# Patient Record
Sex: FEMALE | Race: WHITE | NOT HISPANIC OR LATINO | Employment: FULL TIME | URBAN - NONMETROPOLITAN AREA
[De-identification: names, ages, dates, MRNs, and addresses within clinical notes are randomized per-mention and may not be internally consistent; named-entity substitution may affect disease eponyms.]

---

## 2017-02-28 ENCOUNTER — APPOINTMENT (OUTPATIENT)
Dept: CT IMAGING | Facility: HOSPITAL | Age: 57
End: 2017-02-28

## 2017-02-28 ENCOUNTER — APPOINTMENT (OUTPATIENT)
Dept: GENERAL RADIOLOGY | Facility: HOSPITAL | Age: 57
End: 2017-02-28

## 2017-02-28 ENCOUNTER — HOSPITAL ENCOUNTER (EMERGENCY)
Facility: HOSPITAL | Age: 57
Discharge: HOME | End: 2017-03-01
Attending: FAMILY MEDICINE | Admitting: FAMILY MEDICINE

## 2017-02-28 DIAGNOSIS — R55 NEAR SYNCOPE: ICD-10-CM

## 2017-02-28 DIAGNOSIS — M54.31 SCIATICA OF RIGHT SIDE: Primary | ICD-10-CM

## 2017-02-28 LAB
ALBUMIN SERPL-MCNC: 4.5 G/DL (ref 3.4–4.8)
ALBUMIN/GLOB SERPL: 1.3 G/DL (ref 1.1–1.8)
ALP SERPL-CCNC: 77 U/L (ref 38–126)
ALT SERPL W P-5'-P-CCNC: 27 U/L (ref 9–52)
ANION GAP SERPL CALCULATED.3IONS-SCNC: 12 MMOL/L (ref 5–15)
AST SERPL-CCNC: 23 U/L (ref 14–36)
BASOPHILS # BLD AUTO: 0.02 10*3/MM3 (ref 0–0.2)
BASOPHILS NFR BLD AUTO: 0.3 % (ref 0–2)
BILIRUB SERPL-MCNC: 0.6 MG/DL (ref 0.2–1.3)
BUN BLD-MCNC: 11 MG/DL (ref 7–21)
BUN/CREAT SERPL: 16.7 (ref 7–25)
CALCIUM SPEC-SCNC: 10.3 MG/DL (ref 8.4–10.2)
CHLORIDE SERPL-SCNC: 103 MMOL/L (ref 95–110)
CK MB SERPL-CCNC: 1.05 NG/ML (ref 0–5)
CK SERPL-CCNC: 41 U/L (ref 30–135)
CO2 SERPL-SCNC: 25 MMOL/L (ref 22–31)
CREAT BLD-MCNC: 0.66 MG/DL (ref 0.5–1)
DEPRECATED RDW RBC AUTO: 41 FL (ref 36.4–46.3)
EOSINOPHIL # BLD AUTO: 0.08 10*3/MM3 (ref 0–0.7)
EOSINOPHIL NFR BLD AUTO: 1.2 % (ref 0–7)
ERYTHROCYTE [DISTWIDTH] IN BLOOD BY AUTOMATED COUNT: 12.7 % (ref 11.5–14.5)
GFR SERPL CREATININE-BSD FRML MDRD: 93 ML/MIN/1.73 (ref 51–120)
GLOBULIN UR ELPH-MCNC: 3.4 GM/DL (ref 2.3–3.5)
GLUCOSE BLD-MCNC: 88 MG/DL (ref 60–100)
HCT VFR BLD AUTO: 38.7 % (ref 35–45)
HGB BLD-MCNC: 12.9 G/DL (ref 12–15.5)
IMM GRANULOCYTES # BLD: 0.02 10*3/MM3 (ref 0–0.02)
IMM GRANULOCYTES NFR BLD: 0.3 % (ref 0–0.5)
INR PPP: 0.96 (ref 0.8–1.2)
LYMPHOCYTES # BLD AUTO: 2.3 10*3/MM3 (ref 0.6–4.2)
LYMPHOCYTES NFR BLD AUTO: 34.9 % (ref 10–50)
MAGNESIUM SERPL-MCNC: 2.3 MG/DL (ref 1.6–2.3)
MCH RBC QN AUTO: 29.1 PG (ref 26.5–34)
MCHC RBC AUTO-ENTMCNC: 33.3 G/DL (ref 31.4–36)
MCV RBC AUTO: 87.2 FL (ref 80–98)
MONOCYTES # BLD AUTO: 0.49 10*3/MM3 (ref 0–0.9)
MONOCYTES NFR BLD AUTO: 7.4 % (ref 0–12)
NEUTROPHILS # BLD AUTO: 3.68 10*3/MM3 (ref 2–8.6)
NEUTROPHILS NFR BLD AUTO: 55.9 % (ref 37–80)
NT-PROBNP SERPL-MCNC: 188 PG/ML (ref 0–900)
PLATELET # BLD AUTO: 305 10*3/MM3 (ref 150–450)
PMV BLD AUTO: 10.1 FL (ref 8–12)
POTASSIUM BLD-SCNC: 3.3 MMOL/L (ref 3.5–5.1)
PROT SERPL-MCNC: 7.9 G/DL (ref 6.3–8.6)
PROTHROMBIN TIME: 12.8 SECONDS (ref 11.1–15.3)
RBC # BLD AUTO: 4.44 10*6/MM3 (ref 3.77–5.16)
SODIUM BLD-SCNC: 140 MMOL/L (ref 137–145)
TROPONIN I SERPL-MCNC: <0.012 NG/ML
TSH SERPL DL<=0.05 MIU/L-ACNC: 0.97 MIU/ML (ref 0.46–4.68)
WBC NRBC COR # BLD: 6.59 10*3/MM3 (ref 3.2–9.8)

## 2017-02-28 PROCEDURE — 82553 CREATINE MB FRACTION: CPT | Performed by: FAMILY MEDICINE

## 2017-02-28 PROCEDURE — 93005 ELECTROCARDIOGRAM TRACING: CPT | Performed by: FAMILY MEDICINE

## 2017-02-28 PROCEDURE — 93010 ELECTROCARDIOGRAM REPORT: CPT | Performed by: INTERNAL MEDICINE

## 2017-02-28 PROCEDURE — 80053 COMPREHEN METABOLIC PANEL: CPT | Performed by: FAMILY MEDICINE

## 2017-02-28 PROCEDURE — 83735 ASSAY OF MAGNESIUM: CPT | Performed by: FAMILY MEDICINE

## 2017-02-28 PROCEDURE — 25010000002 ONDANSETRON PER 1 MG: Performed by: FAMILY MEDICINE

## 2017-02-28 PROCEDURE — 99284 EMERGENCY DEPT VISIT MOD MDM: CPT

## 2017-02-28 PROCEDURE — 25010000002 MORPHINE PER 10 MG: Performed by: FAMILY MEDICINE

## 2017-02-28 PROCEDURE — 82550 ASSAY OF CK (CPK): CPT | Performed by: FAMILY MEDICINE

## 2017-02-28 PROCEDURE — 85025 COMPLETE CBC W/AUTO DIFF WBC: CPT | Performed by: FAMILY MEDICINE

## 2017-02-28 PROCEDURE — 83880 ASSAY OF NATRIURETIC PEPTIDE: CPT | Performed by: FAMILY MEDICINE

## 2017-02-28 PROCEDURE — 70450 CT HEAD/BRAIN W/O DYE: CPT

## 2017-02-28 PROCEDURE — 71020 HC CHEST PA AND LATERAL: CPT

## 2017-02-28 PROCEDURE — 96374 THER/PROPH/DIAG INJ IV PUSH: CPT

## 2017-02-28 PROCEDURE — 96375 TX/PRO/DX INJ NEW DRUG ADDON: CPT

## 2017-02-28 PROCEDURE — 96361 HYDRATE IV INFUSION ADD-ON: CPT

## 2017-02-28 PROCEDURE — 84443 ASSAY THYROID STIM HORMONE: CPT | Performed by: FAMILY MEDICINE

## 2017-02-28 PROCEDURE — 84484 ASSAY OF TROPONIN QUANT: CPT | Performed by: FAMILY MEDICINE

## 2017-02-28 PROCEDURE — 85610 PROTHROMBIN TIME: CPT | Performed by: FAMILY MEDICINE

## 2017-02-28 RX ORDER — ASPIRIN 325 MG
325 TABLET ORAL ONCE
Status: COMPLETED | OUTPATIENT
Start: 2017-02-28 | End: 2017-02-28

## 2017-02-28 RX ORDER — SODIUM CHLORIDE 0.9 % (FLUSH) 0.9 %
10 SYRINGE (ML) INJECTION AS NEEDED
Status: DISCONTINUED | OUTPATIENT
Start: 2017-02-28 | End: 2017-03-01 | Stop reason: HOSPADM

## 2017-02-28 RX ORDER — SODIUM CHLORIDE 9 MG/ML
125 INJECTION, SOLUTION INTRAVENOUS CONTINUOUS
Status: DISCONTINUED | OUTPATIENT
Start: 2017-02-28 | End: 2017-03-01 | Stop reason: HOSPADM

## 2017-02-28 RX ORDER — ONDANSETRON 2 MG/ML
4 INJECTION INTRAMUSCULAR; INTRAVENOUS ONCE
Status: COMPLETED | OUTPATIENT
Start: 2017-02-28 | End: 2017-02-28

## 2017-02-28 RX ORDER — MORPHINE SULFATE 4 MG/ML
4 INJECTION, SOLUTION INTRAMUSCULAR; INTRAVENOUS ONCE
Status: COMPLETED | OUTPATIENT
Start: 2017-02-28 | End: 2017-02-28

## 2017-02-28 RX ADMIN — ASPIRIN 325 MG: 325 TABLET, COATED ORAL at 22:35

## 2017-02-28 RX ADMIN — ONDANSETRON 4 MG: 2 INJECTION, SOLUTION INTRAMUSCULAR; INTRAVENOUS at 22:34

## 2017-02-28 RX ADMIN — SODIUM CHLORIDE 500 ML: 9 INJECTION, SOLUTION INTRAVENOUS at 22:35

## 2017-02-28 RX ADMIN — SODIUM CHLORIDE 125 ML/HR: 9 INJECTION, SOLUTION INTRAVENOUS at 22:35

## 2017-02-28 RX ADMIN — MORPHINE SULFATE 4 MG: 4 INJECTION, SOLUTION INTRAMUSCULAR; INTRAVENOUS at 22:34

## 2017-03-01 ENCOUNTER — APPOINTMENT (OUTPATIENT)
Dept: CT IMAGING | Facility: HOSPITAL | Age: 57
End: 2017-03-01

## 2017-03-01 VITALS
TEMPERATURE: 98 F | HEIGHT: 61 IN | BODY MASS INDEX: 24.92 KG/M2 | DIASTOLIC BLOOD PRESSURE: 54 MMHG | WEIGHT: 132 LBS | OXYGEN SATURATION: 100 % | RESPIRATION RATE: 16 BRPM | SYSTOLIC BLOOD PRESSURE: 109 MMHG | HEART RATE: 60 BPM

## 2017-03-01 LAB
HOLD SPECIMEN: NORMAL
HOLD SPECIMEN: NORMAL
WHOLE BLOOD HOLD SPECIMEN: NORMAL
WHOLE BLOOD HOLD SPECIMEN: NORMAL

## 2017-03-01 PROCEDURE — 72192 CT PELVIS W/O DYE: CPT

## 2017-03-01 PROCEDURE — 25010000002 KETOROLAC TROMETHAMINE PER 15 MG: Performed by: FAMILY MEDICINE

## 2017-03-01 PROCEDURE — 72131 CT LUMBAR SPINE W/O DYE: CPT

## 2017-03-01 RX ORDER — KETOROLAC TROMETHAMINE 30 MG/ML
30 INJECTION, SOLUTION INTRAMUSCULAR; INTRAVENOUS EVERY 6 HOURS PRN
Status: DISCONTINUED | OUTPATIENT
Start: 2017-03-01 | End: 2017-03-01 | Stop reason: HOSPADM

## 2017-03-01 RX ORDER — ONDANSETRON 4 MG/1
4 TABLET, FILM COATED ORAL EVERY 6 HOURS PRN
Qty: 10 TABLET | Refills: 0 | Status: SHIPPED | OUTPATIENT
Start: 2017-03-01 | End: 2018-05-10 | Stop reason: HOSPADM

## 2017-03-01 RX ORDER — PREDNISONE 20 MG/1
20 TABLET ORAL 2 TIMES DAILY
Qty: 10 TABLET | Refills: 0 | Status: SHIPPED | OUTPATIENT
Start: 2017-03-01 | End: 2018-05-10 | Stop reason: HOSPADM

## 2017-03-01 RX ADMIN — KETOROLAC TROMETHAMINE 30 MG: 30 INJECTION, SOLUTION INTRAMUSCULAR; INTRAVENOUS at 00:48

## 2017-03-01 NOTE — ED NOTES
Discharge instructions reviewed and questions answered. Medically cleared for discharge per MD. VSS. RX X 2. Medications reviewed.        Jeannette Paris RN  03/01/17 0226

## 2017-03-01 NOTE — ED NOTES
"Pt complains that she is no unable to roll in the bed or move her legs. Pt states this is new. Assessed range of motion. Pt complains of right hip pain and left knee pain. Pts legs appear stiff. Pt has feeling in legs and feet, just states \"I cant move them\". MD aware.      Jeannette Paris RN  03/01/17 0103    "

## 2017-03-01 NOTE — ED PROVIDER NOTES
"Subjective   Patient is a 56 y.o. female presenting with syncope and chest pain.   Syncope   Episode history:  Single (near-syncope)  Most recent episode:  Today  Timing:  Intermittent  Progression:  Improving  Chronicity:  New  Context: not blood draw, not bowel movement and not exertion    Context comment:  While working at [employer]  Witnessed: yes    Relieved by:  Nothing  Worsened by:  Nothing  Ineffective treatments:  None tried  Associated symptoms: confusion, difficulty breathing, headaches, shortness of breath and weakness (generalized)    Associated symptoms: no anxiety, no chest pain, no diaphoresis, no dizziness, no fever, no focal weakness, no nausea, no palpitations, no recent fall, no recent injury, no seizures, no visual change and no vomiting    Risk factors: coronary artery disease (\"light heart attack\") and vascular disease (MVP)    Risk factors: no seizures    Risk factors comment:  Sees Dr Howell in [place]  Chest Pain   Pain location:  L chest  Pain quality: dull and pressure    Pain radiates to:  Upper back  Pain severity:  Moderate  Onset quality:  Sudden  Duration:  3 hours  Timing:  Intermittent  Progression:  Worsening  Chronicity:  New  Associated symptoms: headache, shortness of breath, syncope and weakness (generalized)    Associated symptoms: no abdominal pain, no anxiety, no cough, no diaphoresis, no dizziness, no dysphagia, no fatigue, no fever, no nausea, no palpitations and no vomiting        Review of Systems   Constitutional: Negative for appetite change, chills, diaphoresis, fatigue and fever.   HENT: Negative for congestion, ear discharge, ear pain, nosebleeds, rhinorrhea, sinus pressure, sore throat and trouble swallowing.    Eyes: Negative for discharge and redness.   Respiratory: Positive for shortness of breath. Negative for apnea, cough, chest tightness and wheezing.    Cardiovascular: Positive for syncope. Negative for chest pain and palpitations. "   Gastrointestinal: Negative for abdominal pain, diarrhea, nausea and vomiting.   Endocrine: Negative for polyuria.   Genitourinary: Negative for dysuria, frequency and urgency.   Musculoskeletal: Negative for myalgias and neck pain.   Skin: Negative for color change and rash.   Allergic/Immunologic: Negative for immunocompromised state.   Neurological: Positive for weakness (generalized) and headaches. Negative for dizziness, focal weakness, seizures, syncope and light-headedness.   Hematological: Negative for adenopathy. Does not bruise/bleed easily.   Psychiatric/Behavioral: Positive for confusion. Negative for behavioral problems.   All other systems reviewed and are negative.      History reviewed. No pertinent past medical history.    Allergies   Allergen Reactions   • Codeine Anaphylaxis   • Latex Itching     Causes skin peeling       History reviewed. No pertinent past surgical history.    History reviewed. No pertinent family history.    Social History     Social History   • Marital status:      Spouse name: N/A   • Number of children: N/A   • Years of education: N/A     Social History Main Topics   • Smoking status: None   • Smokeless tobacco: None   • Alcohol use None   • Drug use: None   • Sexual activity: Not Asked     Other Topics Concern   • None     Social History Narrative   • None           Objective   Physical Exam   Constitutional: She is oriented to person, place, and time. She appears well-developed and well-nourished.   HENT:   Head: Normocephalic and atraumatic.   Nose: Nose normal.   Mouth/Throat: Oropharynx is clear and moist.   Eyes: Conjunctivae and EOM are normal. Pupils are equal, round, and reactive to light. Right eye exhibits no discharge. Left eye exhibits no discharge. No scleral icterus.   Neck: Normal range of motion. Neck supple. No tracheal deviation present.   Cardiovascular: Normal rate, regular rhythm and normal heart sounds.    No murmur heard.  Pulmonary/Chest:  Effort normal and breath sounds normal. No stridor. No respiratory distress. She has no wheezes. She has no rales.   Abdominal: Soft. Bowel sounds are normal. She exhibits no distension and no mass. There is no tenderness. There is no rebound and no guarding.   Musculoskeletal: She exhibits no edema.        Right hip: She exhibits decreased range of motion, decreased strength, tenderness and bony tenderness.        Lumbar back: She exhibits tenderness. She exhibits no swelling, no edema and no deformity.   Patient able to stand at bedside unassisted.  However she made little effort towards walking, and states that she is unable to ambulate.  Reflexes present bilaterally both feet, no Babinski sign elicited. Patient is able to move her toes dorsiflex and plantar flex.   Neurological: She is alert and oriented to person, place, and time. Coordination normal.   Skin: Skin is warm and dry. No rash noted. No erythema.   Psychiatric: She has a normal mood and affect. Her behavior is normal. Thought content normal.   Nursing note and vitals reviewed.      ECG 12 Lead    Date/Time: 3/1/2017 2:07 AM  Performed by: RUBA BALLARD  Authorized by: RUBA BALLARD   Interpreted by physician  Rhythm: sinus rhythm  Rate: normal  QRS axis: normal  Conduction: conduction normal  ST Segments: ST segments normal                 ED Course  ED Course        Labs Reviewed   COMPREHENSIVE METABOLIC PANEL - Abnormal; Notable for the following:        Result Value    Potassium 3.3 (*)     Calcium 10.3 (*)     All other components within normal limits   BNP (IN-HOUSE) - Normal   TROPONIN (IN-HOUSE) - Normal   CBC WITH AUTO DIFFERENTIAL - Normal   PROTIME-INR - Normal    Narrative:     Therapeutic range for most indications is 2.0-3.0 INR,  or 2.5-3.5 for mechanical heart valves.   CK - Normal   CK MB - Normal   TSH - Normal   MAGNESIUM - Normal   RAINBOW DRAW    Narrative:     The following orders were created for panel order  [place] Draw.  Procedure                               Abnormality         Status                     ---------                               -----------         ------                     Light Blue Top[[id number]]                                    Final result               Green Top (Gel)[[id number]]                                   Final result               Lavender Top[[id number]]                                      Final result               Gold Top - SST[[id number]]                                    Final result                 Please view results for these tests on the individual orders.   CBC AND DIFFERENTIAL    Narrative:     The following orders were created for panel order CBC & Differential.  Procedure                               Abnormality         Status                     ---------                               -----------         ------                     CBC Auto Differential[[id number]]         Normal              Final result                 Please view results for these tests on the individual orders.   LIGHT BLUE TOP   GREEN TOP   LAVENDER TOP   GOLD TOP - SST       CT Lumbar Spine Without Contrast   Final Result      1. No acute bony abnormality    2. Mild degenerative changes as described above      Electronically signed by:  Samuel Guzman MD  3/1/2017 1:31 AM CST   Workstation: [id number]      CT Pelvis Without Contrast   Final Result   No acute fracture      Electronically signed by:  Samuel Guzman MD  3/1/2017 1:23 AM CST   Workstation: [email or web address]      XR Chest 2 View   Final Result   CONCLUSION:  No acute cardiopulmonary process identified.      Electronically signed by:  Romana Rodriguez MD  2/28/2017 11:33 PM   CST Workstation: [id number]      CT Head Without Contrast   Final Result   No obvious acute abnormality. Consider MRI for   further assessment if clinically warranted.      Electronically signed by:  Samuel Guzman MD  2/28/2017 11:04 PM   CST Workstation: [id number]           Transferred to [facility] for further evaluation of back pain and weakness offered to patient, with family present in the room.  Admission to the [facility] offered to patient as well for same symptoms, but patient has chosen to go home and refuses admission or transfer.  Pain medications offered for back pain treatment at home, however, patient states that she attends pain clinic for her chronic back pain and states that she receives injections as well.            MDM    Final diagnoses:   Sciatica of right side   Near syncope            Gabriel Schrader MD  03/03/17 7865

## 2017-11-23 ENCOUNTER — APPOINTMENT (OUTPATIENT)
Dept: GENERAL RADIOLOGY | Facility: HOSPITAL | Age: 57
End: 2017-11-23

## 2017-11-23 ENCOUNTER — HOSPITAL ENCOUNTER (EMERGENCY)
Facility: HOSPITAL | Age: 57
Discharge: HOME | End: 2017-11-23
Attending: EMERGENCY MEDICINE | Admitting: EMERGENCY MEDICINE

## 2017-11-23 VITALS
HEIGHT: 61 IN | OXYGEN SATURATION: 98 % | DIASTOLIC BLOOD PRESSURE: 58 MMHG | TEMPERATURE: 97.8 F | HEART RATE: 56 BPM | BODY MASS INDEX: 23.79 KG/M2 | WEIGHT: 126 LBS | RESPIRATION RATE: 16 BRPM | SYSTOLIC BLOOD PRESSURE: 107 MMHG

## 2017-11-23 DIAGNOSIS — R07.9 CHEST PAIN, UNSPECIFIED TYPE: Primary | ICD-10-CM

## 2017-11-23 LAB
ANION GAP SERPL CALCULATED.3IONS-SCNC: 10 MMOL/L (ref 5–15)
APTT PPP: 30.2 SECONDS (ref 20–40.3)
BASOPHILS # BLD AUTO: 0.03 10*3/MM3 (ref 0–0.2)
BASOPHILS NFR BLD AUTO: 0.4 % (ref 0–2)
BUN BLD-MCNC: 14 MG/DL (ref 7–21)
BUN/CREAT SERPL: 16.9 (ref 7–25)
CALCIUM SPEC-SCNC: 9.9 MG/DL (ref 8.4–10.2)
CHLORIDE SERPL-SCNC: 103 MMOL/L (ref 95–110)
CO2 SERPL-SCNC: 27 MMOL/L (ref 22–31)
CREAT BLD-MCNC: 0.83 MG/DL (ref 0.5–1)
D-DIMER, QUANTITATIVE (MAD,POW, STR): <270 NG/ML (FEU) (ref 0–470)
DEPRECATED RDW RBC AUTO: 40.1 FL (ref 36.4–46.3)
EOSINOPHIL # BLD AUTO: 0.05 10*3/MM3 (ref 0–0.7)
EOSINOPHIL NFR BLD AUTO: 0.7 % (ref 0–7)
ERYTHROCYTE [DISTWIDTH] IN BLOOD BY AUTOMATED COUNT: 12.5 % (ref 11.5–14.5)
GFR SERPL CREATININE-BSD FRML MDRD: 71 ML/MIN/1.73 (ref 51–120)
GLUCOSE BLD-MCNC: 93 MG/DL (ref 60–100)
HCT VFR BLD AUTO: 37 % (ref 35–45)
HGB BLD-MCNC: 12.3 G/DL (ref 12–15.5)
HOLD SPECIMEN: NORMAL
IMM GRANULOCYTES # BLD: 0.01 10*3/MM3 (ref 0–0.02)
IMM GRANULOCYTES NFR BLD: 0.1 % (ref 0–0.5)
INR PPP: 0.95 (ref 0.8–1.2)
LYMPHOCYTES # BLD AUTO: 2.53 10*3/MM3 (ref 0.6–4.2)
LYMPHOCYTES NFR BLD AUTO: 37 % (ref 10–50)
MCH RBC QN AUTO: 29.5 PG (ref 26.5–34)
MCHC RBC AUTO-ENTMCNC: 33.2 G/DL (ref 31.4–36)
MCV RBC AUTO: 88.7 FL (ref 80–98)
MONOCYTES # BLD AUTO: 0.51 10*3/MM3 (ref 0–0.9)
MONOCYTES NFR BLD AUTO: 7.5 % (ref 0–12)
NEUTROPHILS # BLD AUTO: 3.71 10*3/MM3 (ref 2–8.6)
NEUTROPHILS NFR BLD AUTO: 54.3 % (ref 37–80)
PLATELET # BLD AUTO: 261 10*3/MM3 (ref 150–450)
PMV BLD AUTO: 9.5 FL (ref 8–12)
POTASSIUM BLD-SCNC: 3.5 MMOL/L (ref 3.5–5.1)
PROTHROMBIN TIME: 12.6 SECONDS (ref 11.1–15.3)
RBC # BLD AUTO: 4.17 10*6/MM3 (ref 3.77–5.16)
SODIUM BLD-SCNC: 140 MMOL/L (ref 137–145)
TROPONIN I SERPL-MCNC: <0.012 NG/ML
WBC NRBC COR # BLD: 6.84 10*3/MM3 (ref 3.2–9.8)

## 2017-11-23 PROCEDURE — 74000 HC ABDOMEN KUB: CPT

## 2017-11-23 PROCEDURE — 93010 ELECTROCARDIOGRAM REPORT: CPT | Performed by: INTERNAL MEDICINE

## 2017-11-23 PROCEDURE — 84484 ASSAY OF TROPONIN QUANT: CPT | Performed by: EMERGENCY MEDICINE

## 2017-11-23 PROCEDURE — 80048 BASIC METABOLIC PNL TOTAL CA: CPT | Performed by: EMERGENCY MEDICINE

## 2017-11-23 PROCEDURE — 85025 COMPLETE CBC W/AUTO DIFF WBC: CPT | Performed by: EMERGENCY MEDICINE

## 2017-11-23 PROCEDURE — 85379 FIBRIN DEGRADATION QUANT: CPT | Performed by: EMERGENCY MEDICINE

## 2017-11-23 PROCEDURE — 85730 THROMBOPLASTIN TIME PARTIAL: CPT | Performed by: EMERGENCY MEDICINE

## 2017-11-23 PROCEDURE — 85610 PROTHROMBIN TIME: CPT | Performed by: EMERGENCY MEDICINE

## 2017-11-23 PROCEDURE — 71020 HC CHEST PA AND LATERAL: CPT

## 2017-11-23 PROCEDURE — 93005 ELECTROCARDIOGRAM TRACING: CPT | Performed by: EMERGENCY MEDICINE

## 2017-11-23 PROCEDURE — 99284 EMERGENCY DEPT VISIT MOD MDM: CPT

## 2017-11-23 RX ORDER — CITALOPRAM 20 MG/1
20 TABLET ORAL DAILY
COMMUNITY
End: 2018-05-10 | Stop reason: HOSPADM

## 2017-11-23 RX ORDER — SODIUM CHLORIDE 0.9 % (FLUSH) 0.9 %
10 SYRINGE (ML) INJECTION AS NEEDED
Status: DISCONTINUED | OUTPATIENT
Start: 2017-11-23 | End: 2017-11-23 | Stop reason: HOSPADM

## 2017-11-23 RX ORDER — TIZANIDINE 4 MG/1
4 TABLET ORAL NIGHTLY PRN
COMMUNITY
End: 2018-05-10 | Stop reason: HOSPADM

## 2017-11-23 RX ORDER — HYDROCODONE BITARTRATE AND ACETAMINOPHEN 10; 325 MG/1; MG/1
1 TABLET ORAL EVERY 6 HOURS PRN
COMMUNITY
End: 2018-05-10 | Stop reason: HOSPADM

## 2017-11-24 NOTE — ED PROVIDER NOTES
Subjective   History of Present Illness  57-year-old female with history of chronic pain issues presents to the emergency department because of intermittent sharp left-sided chest pain under the left lateral breast.  It is nonexertional.  She has associated shortness of breath with this pain.  It is been occurring intermittently since last Friday after she lost her job.  She does not take any oral contraceptive medications.  She has no previous history of coronary artery disease, hypertension, dyslipidemia.  The pain relieved spontaneously.  It lasted for approximately 10 minutes today.  On previous episodes lasted for approximately 1-2 minutes.  She has no diaphoresis or nausea social with this chest pain.  She has no long distance plane travel or lower extremity edema.  No history of DVTs.  No history of cancer or active treatment for cancer.  No recent surgery last 2 months.  Review of Systems   Constitutional: Negative for chills and fever.   HENT: Negative for rhinorrhea, sinus pressure and sneezing.    Eyes: Negative for pain and redness.   Respiratory: Positive for shortness of breath. Negative for cough and chest tightness.    Cardiovascular: Positive for chest pain. Negative for palpitations and leg swelling.   Gastrointestinal: Negative for abdominal pain, diarrhea, nausea and vomiting.   Genitourinary: Negative for dysuria, flank pain, menstrual problem, pelvic pain, vaginal bleeding, vaginal discharge and vaginal pain.   Musculoskeletal: Negative for arthralgias, back pain and joint swelling.   Skin: Negative for rash.   Neurological: Negative for dizziness, syncope, weakness, numbness and headaches.   Hematological: Negative.    Psychiatric/Behavioral: Negative for self-injury and suicidal ideas.       History reviewed. No pertinent past medical history.    Allergies   Allergen Reactions   • Codeine Anaphylaxis   • Latex Itching     Causes skin peeling       History reviewed. No pertinent surgical  history.    History reviewed. No pertinent family history.    Social History     Social History   • Marital status:      Spouse name: N/A   • Number of children: N/A   • Years of education: N/A     Social History Main Topics   • Smoking status: Never Smoker   • Smokeless tobacco: None   • Alcohol use No   • Drug use: No   • Sexual activity: Not Asked     Other Topics Concern   • None     Social History Narrative   • None           Objective   Physical Exam   Constitutional: She is oriented to person, place, and time. She appears well-developed and well-nourished.   HENT:   Head: Normocephalic and atraumatic.   Eyes: Conjunctivae and EOM are normal. Pupils are equal, round, and reactive to light.   Neck: Normal range of motion. Neck supple.   Cardiovascular: Normal rate, regular rhythm and normal heart sounds.    Pulmonary/Chest: Effort normal and breath sounds normal. No respiratory distress. She has no wheezes.   Patient has some mild tenderness on the left lateral chest wall.  No rashes.   Abdominal: Soft. Bowel sounds are normal.   Musculoskeletal: Normal range of motion.   Neurological: She is alert and oriented to person, place, and time.   Skin: Skin is warm and dry.   Psychiatric: She has a normal mood and affect.   Nursing note and vitals reviewed.      Procedures         ED Course  ED Course        Labs Reviewed   BASIC METABOLIC PANEL - Normal   PROTIME-INR - Normal    Narrative:     Therapeutic range for most indications is 2.0-3.0 INR,  or 2.5-3.5 for mechanical heart valves.   APTT - Normal    Narrative:     The recommended Heparin therapeutic range is 68-97 seconds.   TROPONIN (IN-HOUSE) - Normal   D-DIMER, QUANTITATIVE - Normal    Narrative:     Dimer values <500 ng/ml FEU are FDA approved as aid in diagnosis of deep venous thrombosis and pulmonary embolism.  This test should not be used in an exclusion strategy with pretest probability alone.    A recent guideline regarding diagnosis for  pulmonary thomboembolism recommends an adjusted exclusion criterion of age x 10 ng/ml FEU for patients >50 years of age ([place] Intern Med 2015; 163: 701-711).   CBC WITH AUTO DIFFERENTIAL - Normal   CBC AND DIFFERENTIAL    Narrative:     The following orders were created for panel order CBC & Differential.  Procedure                               Abnormality         Status                     ---------                               -----------         ------                     CBC Auto Differential[[id number]]        Normal              Final result                 Please view results for these tests on the individual orders.   EXTRA TUBES    Narrative:     The following orders were created for panel order Extra Tubes.  Procedure                               Abnormality         Status                     ---------                               -----------         ------                     Gold Top - SST[[id number]]                                   In process                   Please view results for these tests on the individual orders.   GOLD TOP - SST     XR Chest 2 View   Final Result   No acute pulmonary or pleural finding.      Electronically signed by:  Vikas Lovelace MD  11/23/2017 7:41 PM   CST Workstation: [id number]      XR Abdomen KUB   Final Result   Nonobstructive bowel gas pattern.      Electronically signed by:  Vikas Lovelace MD  11/23/2017 7:39 PM   CST Workstation: [id number]                  MDM  Number of Diagnoses or Management Options  Chest pain, unspecified type:   Diagnosis management comments: Atypical sharp chest pain is been occurring intermittently for last week.  Doesn't some acute coronary syndrome.  Given the patient's age and sharp component to his shortness of breath we'll send a d-dimer risk stratify for PE.  Likely discharge home if workup is negative.      Troponin less than 0.012.  D-dimer less than 250.  Based off of HEART score patient's low risk for major cardiac event.  Low risk  for pulmonary embolism with a negative d-dimer and low risk Wells criteria.  Did not clear etiology of patient's symptoms but as stated have a low suspicion for serious etiology at this time.  I discussed this with the patient.  I told her to follow primary care doctor next week.  Discussed anti-inflammatories for possible pleurisy.  Is also possible this is musculoskeletal or stress related given that she recently lost her job.  Discharge patient home      Final diagnoses:   Chest pain, unspecified type            Kamar Marroquin MD  11/23/17 2012

## 2017-11-24 NOTE — ED NOTES
Pt given d/c instructions. Vss. INT d/c with cath intact. Dsg applied. Pt ambulated to lobby with family. No distress noted.     DUGLAS Stovall RN  11/23/17 2033

## 2018-04-29 ENCOUNTER — HOSPITAL ENCOUNTER (EMERGENCY)
Facility: HOSPITAL | Age: 58
Discharge: HOME | End: 2018-04-29
Attending: FAMILY MEDICINE | Admitting: FAMILY MEDICINE

## 2018-04-29 VITALS
TEMPERATURE: 99 F | WEIGHT: 130 LBS | RESPIRATION RATE: 18 BRPM | HEART RATE: 70 BPM | BODY MASS INDEX: 24.55 KG/M2 | SYSTOLIC BLOOD PRESSURE: 118 MMHG | HEIGHT: 61 IN | OXYGEN SATURATION: 98 % | DIASTOLIC BLOOD PRESSURE: 66 MMHG

## 2018-04-29 DIAGNOSIS — R10.31 RIGHT LOWER QUADRANT ABDOMINAL PAIN: Primary | ICD-10-CM

## 2018-04-29 LAB
ALBUMIN SERPL-MCNC: 4.2 G/DL (ref 3.4–4.8)
ALBUMIN/GLOB SERPL: 1.3 G/DL (ref 1.1–1.8)
ALP SERPL-CCNC: 64 U/L (ref 38–126)
ALT SERPL W P-5'-P-CCNC: 35 U/L (ref 9–52)
ANION GAP SERPL CALCULATED.3IONS-SCNC: 10 MMOL/L (ref 5–15)
AST SERPL-CCNC: 22 U/L (ref 14–36)
BASOPHILS # BLD AUTO: 0.02 10*3/MM3 (ref 0–0.2)
BASOPHILS NFR BLD AUTO: 0.3 % (ref 0–2)
BILIRUB SERPL-MCNC: 0.6 MG/DL (ref 0.2–1.3)
BILIRUB UR QL STRIP: NEGATIVE
BUN BLD-MCNC: 17 MG/DL (ref 7–21)
BUN/CREAT SERPL: 24.3 (ref 7–25)
CALCIUM SPEC-SCNC: 10.4 MG/DL (ref 8.4–10.2)
CHLORIDE SERPL-SCNC: 104 MMOL/L (ref 95–110)
CLARITY UR: CLEAR
CO2 SERPL-SCNC: 29 MMOL/L (ref 22–31)
COLOR UR: YELLOW
CREAT BLD-MCNC: 0.7 MG/DL (ref 0.5–1)
DEPRECATED RDW RBC AUTO: 40.1 FL (ref 36.4–46.3)
EOSINOPHIL # BLD AUTO: 0.1 10*3/MM3 (ref 0–0.7)
EOSINOPHIL NFR BLD AUTO: 1.6 % (ref 0–7)
ERYTHROCYTE [DISTWIDTH] IN BLOOD BY AUTOMATED COUNT: 12.5 % (ref 11.5–14.5)
GFR SERPL CREATININE-BSD FRML MDRD: 87 ML/MIN/1.73 (ref 51–120)
GLOBULIN UR ELPH-MCNC: 3.2 GM/DL (ref 2.3–3.5)
GLUCOSE BLD-MCNC: 89 MG/DL (ref 60–100)
GLUCOSE UR STRIP-MCNC: NEGATIVE MG/DL
HCG INTACT+B SERPL-ACNC: 7.28 MIU/ML
HCT VFR BLD AUTO: 39.7 % (ref 35–45)
HGB BLD-MCNC: 13.3 G/DL (ref 12–15.5)
HGB UR QL STRIP.AUTO: NEGATIVE
IMM GRANULOCYTES # BLD: 0.01 10*3/MM3 (ref 0–0.02)
IMM GRANULOCYTES NFR BLD: 0.2 % (ref 0–0.5)
KETONES UR QL STRIP: NEGATIVE
LEUKOCYTE ESTERASE UR QL STRIP.AUTO: NEGATIVE
LYMPHOCYTES # BLD AUTO: 1.91 10*3/MM3 (ref 0.6–4.2)
LYMPHOCYTES NFR BLD AUTO: 30.5 % (ref 10–50)
MAGNESIUM SERPL-MCNC: 2.2 MG/DL (ref 1.6–2.3)
MCH RBC QN AUTO: 29.4 PG (ref 26.5–34)
MCHC RBC AUTO-ENTMCNC: 33.5 G/DL (ref 31.4–36)
MCV RBC AUTO: 87.8 FL (ref 80–98)
MONOCYTES # BLD AUTO: 0.49 10*3/MM3 (ref 0–0.9)
MONOCYTES NFR BLD AUTO: 7.8 % (ref 0–12)
NEUTROPHILS # BLD AUTO: 3.74 10*3/MM3 (ref 2–8.6)
NEUTROPHILS NFR BLD AUTO: 59.6 % (ref 37–80)
NITRITE UR QL STRIP: NEGATIVE
PH UR STRIP.AUTO: 8 [PH] (ref 5–9)
PLATELET # BLD AUTO: 273 10*3/MM3 (ref 150–450)
PMV BLD AUTO: 9.8 FL (ref 8–12)
POTASSIUM BLD-SCNC: 4.1 MMOL/L (ref 3.5–5.1)
PROT SERPL-MCNC: 7.4 G/DL (ref 6.3–8.6)
PROT UR QL STRIP: NEGATIVE
RBC # BLD AUTO: 4.52 10*6/MM3 (ref 3.77–5.16)
SODIUM BLD-SCNC: 143 MMOL/L (ref 137–145)
SP GR UR STRIP: 1.02 (ref 1–1.03)
UROBILINOGEN UR QL STRIP: NORMAL
WBC NRBC COR # BLD: 6.27 10*3/MM3 (ref 3.2–9.8)
WHOLE BLOOD HOLD SPECIMEN: NORMAL

## 2018-04-29 PROCEDURE — 99283 EMERGENCY DEPT VISIT LOW MDM: CPT

## 2018-04-29 PROCEDURE — 83735 ASSAY OF MAGNESIUM: CPT | Performed by: FAMILY MEDICINE

## 2018-04-29 PROCEDURE — 80053 COMPREHEN METABOLIC PANEL: CPT | Performed by: FAMILY MEDICINE

## 2018-04-29 PROCEDURE — 85025 COMPLETE CBC W/AUTO DIFF WBC: CPT | Performed by: FAMILY MEDICINE

## 2018-04-29 PROCEDURE — 81003 URINALYSIS AUTO W/O SCOPE: CPT | Performed by: FAMILY MEDICINE

## 2018-04-29 PROCEDURE — 84702 CHORIONIC GONADOTROPIN TEST: CPT | Performed by: FAMILY MEDICINE

## 2018-04-29 RX ORDER — ONDANSETRON 4 MG/1
4 TABLET, ORALLY DISINTEGRATING ORAL EVERY 6 HOURS PRN
Qty: 10 TABLET | Refills: 0 | Status: SHIPPED | OUTPATIENT
Start: 2018-04-29 | End: 2018-05-10 | Stop reason: HOSPADM

## 2018-05-10 ENCOUNTER — OFFICE VISIT (OUTPATIENT)
Dept: OBSTETRICS AND GYNECOLOGY | Facility: CLINIC | Age: 58
End: 2018-05-10

## 2018-05-10 VITALS
DIASTOLIC BLOOD PRESSURE: 63 MMHG | HEIGHT: 61 IN | BODY MASS INDEX: 25.11 KG/M2 | WEIGHT: 133 LBS | SYSTOLIC BLOOD PRESSURE: 102 MMHG

## 2018-05-10 DIAGNOSIS — Z32.01 POSITIVE PREGNANCY TEST: Primary | ICD-10-CM

## 2018-05-10 DIAGNOSIS — N63.0 BREAST MASS: ICD-10-CM

## 2018-05-10 DIAGNOSIS — N93.9 ABNORMAL VAGINAL BLEEDING: ICD-10-CM

## 2018-05-10 DIAGNOSIS — Z32.00 PREGNANCY EXAMINATION OR TEST, PREGNANCY UNCONFIRMED: Primary | ICD-10-CM

## 2018-05-10 DIAGNOSIS — Z12.31 SCREENING MAMMOGRAM, ENCOUNTER FOR: ICD-10-CM

## 2018-05-10 DIAGNOSIS — Z80.3 FAMILY HISTORY OF BREAST CANCER: ICD-10-CM

## 2018-05-10 DIAGNOSIS — Z01.419 WELL WOMAN EXAM WITH ROUTINE GYNECOLOGICAL EXAM: ICD-10-CM

## 2018-05-10 LAB
B-HCG UR QL: NEGATIVE
CANDIDA ALBICANS: NEGATIVE
GARDNERELLA VAGINALIS: NEGATIVE
INTERNAL NEGATIVE CONTROL: POSITIVE
INTERNAL POSITIVE CONTROL: POSITIVE
Lab: NORMAL
TRICHOMONAS VAGINALIS PCR: NEGATIVE

## 2018-05-10 PROCEDURE — G0123 SCREEN CERV/VAG THIN LAYER: HCPCS | Performed by: ADVANCED PRACTICE MIDWIFE

## 2018-05-10 PROCEDURE — 87480 CANDIDA DNA DIR PROBE: CPT | Performed by: ADVANCED PRACTICE MIDWIFE

## 2018-05-10 PROCEDURE — 81025 URINE PREGNANCY TEST: CPT | Performed by: ADVANCED PRACTICE MIDWIFE

## 2018-05-10 PROCEDURE — 87510 GARDNER VAG DNA DIR PROBE: CPT | Performed by: ADVANCED PRACTICE MIDWIFE

## 2018-05-10 PROCEDURE — 99396 PREV VISIT EST AGE 40-64: CPT | Performed by: ADVANCED PRACTICE MIDWIFE

## 2018-05-10 PROCEDURE — 87624 HPV HI-RISK TYP POOLED RSLT: CPT | Performed by: ADVANCED PRACTICE MIDWIFE

## 2018-05-10 PROCEDURE — 87660 TRICHOMONAS VAGIN DIR PROBE: CPT | Performed by: ADVANCED PRACTICE MIDWIFE

## 2018-05-16 ENCOUNTER — OFFICE VISIT (OUTPATIENT)
Dept: OBSTETRICS AND GYNECOLOGY | Facility: CLINIC | Age: 58
End: 2018-05-16

## 2018-05-16 VITALS
DIASTOLIC BLOOD PRESSURE: 70 MMHG | BODY MASS INDEX: 25.49 KG/M2 | WEIGHT: 135 LBS | HEIGHT: 61 IN | SYSTOLIC BLOOD PRESSURE: 128 MMHG

## 2018-05-16 DIAGNOSIS — N95.0 PMB (POSTMENOPAUSAL BLEEDING): Primary | ICD-10-CM

## 2018-05-16 DIAGNOSIS — E34.9 ELEVATED SERUM HCG IN FEMALE, NOT PREGNANT: ICD-10-CM

## 2018-05-16 DIAGNOSIS — N63.0 BREAST MASS: ICD-10-CM

## 2018-05-16 DIAGNOSIS — N63.0 BREAST MASS IN FEMALE: Primary | ICD-10-CM

## 2018-05-16 LAB
LAB AP CASE REPORT: NORMAL
LAB AP GYN ADDITIONAL INFORMATION: NORMAL
LAB AP GYN OTHER FINDINGS: NORMAL
Lab: NORMAL
PATH INTERP SPEC-IMP: NORMAL
STAT OF ADQ CVX/VAG CYTO-IMP: NORMAL

## 2018-05-16 PROCEDURE — 99213 OFFICE O/P EST LOW 20 MIN: CPT | Performed by: OBSTETRICS & GYNECOLOGY

## 2018-05-16 NOTE — PROGRESS NOTES
Subjective   Sondra Felder is a 57 y.o. female F/U from an ER visit where she complained of Right flank pain, abdominal pain, nausea, & fatigue. She is sexually active & had a + quant HCG in the ER. She also complained of spotting a couple of weeks ago    Gynecologic Exam   The patient's primary symptoms include vaginal bleeding. This is a new problem. The current episode started 1 to 4 weeks ago. The problem occurs rarely. The problem has been unchanged. The pain is mild. Associated symptoms include abdominal pain, flank pain and nausea. The vaginal bleeding is spotting. She has not been passing clots. She has not been passing tissue. Nothing aggravates the symptoms. She is sexually active. She uses nothing for contraception.        The following portions of the patient's history were reviewed and updated as appropriate: allergies, current medications, past family history, past medical history, past social history, past surgical history and problem list.    Review of Systems   Constitutional: Positive for fatigue.   Respiratory: Negative.    Cardiovascular: Negative.    Gastrointestinal: Positive for abdominal pain and nausea.   Genitourinary: Positive for flank pain.   Neurological: Negative.    Psychiatric/Behavioral: Negative.        Objective   Physical Exam   Constitutional: She is oriented to person, place, and time. She appears well-developed and well-nourished.   HENT:   Head: Normocephalic and atraumatic.   Eyes: EOM are normal. Pupils are equal, round, and reactive to light.   Neck: Normal range of motion. Neck supple.   Cardiovascular: Normal rate, regular rhythm, normal heart sounds and intact distal pulses.    Pulmonary/Chest: Effort normal and breath sounds normal.   Abdominal: Soft. Bowel sounds are normal. There is tenderness in the right lower quadrant.       Genitourinary: Vagina normal and uterus normal. Pelvic exam was performed with patient supine.   Genitourinary Comments: Cystocele 1  "degree   Musculoskeletal: Normal range of motion.   Lymphadenopathy:   Right axilla swollen tender lymph node   Neurological: She is alert and oriented to person, place, and time.   Skin: Skin is warm and dry.   Psychiatric: She has a normal mood and affect. Her behavior is normal. Judgment and thought content normal.         Assessment/Plan   right breast 2 cm mass at 11 o'clock round, well cicumscribed, tender- left breast mass oblong 3 cm, tender, well circumscribed at 5 o'clock   Pap today  Mammogram next week  Negative urine HCG today  F/U with Dr. Saavedra next week             Subjective     Chief Complaint   Patient presents with   • Gynecologic Exam     Negative pregnancy test in office       Sondra Felder is a 57 y.o. female who presents for an annual exam. The patient has no complaints today. The patient is sexually active. GYN screening history: unknown. The patient wears seatbelts: yes. The patient participates in regular exercise: no. Has the patient ever been transfused or tattooed?: yes. The patient reports that there is not domestic violence in her life.     Menstrual History:  OB History     No data available         Menarche age: not asked  No LMP recorded.         The following portions of the patient's history were reviewed and updated as appropriate:vital signs, allergies, current medications, past medical history, past social history, past surgical history and problem list    Review of Systems   Pertinent items are noted in HPI.     Objective     /63   Ht 154.9 cm (61\")   Wt 60.3 kg (133 lb)   BMI 25.13 kg/m²       Physical Exam    General:   alert and appears older than stated age   Heart: regular rate and rhythm, S1, S2 normal, no murmur, click, rub or gallop   Lungs: clear to auscultation bilaterally   Breast: Right breast mass 2 cm @ 11 o'clock, round, well circumscribed, immobile, Left breast mass 3 cm, oblong @ 5 o'clock immobile, tender, well circumscribed   Abdomen: {soft " with RLQ tenderness & guarding   Vulva: normal   Vagina: normal mucosa, normal discharge, Cystocele 1 degree   Cervix: no bleeding following Pap, no cervical motion tenderness and no lesions   Uterus: normal size   Adnexa: normal adnexa   Rectal: Not performed today       Lab Review   Labs: HCG - quantitative, Urine pregnancy test     Imaging   Ultrasound - Pelvic Vaginal    Assessment/Plan     ASSESSMENT  Healthy female exam.    1. Pregnancy examination or test, pregnancy unconfirmed    2. Abnormal vaginal bleeding    3. Well woman exam with routine gynecological exam    4. Screening mammogram, encounter for    5. Breast mass    6. Family history of breast cancer         PLAN  1.   Orders Placed This Encounter   Procedures   • Gardnerella vaginalis, Trichomonas vaginalis, Candida albicans, PCR - Swab, Vagina   • Mammo screening digital tomosynthesis bilateral w CAD   • Mammography screening bilateral   • POC Pregnancy, Urine       2. Medications prescribed this encounter:    No orders of the defined types were placed in this encounter.      Diagnoses and all orders for this visit:    Pregnancy examination or test, pregnancy unconfirmed  -     POC Pregnancy, Urine    Abnormal vaginal bleeding  -     Gardnerella vaginalis, Trichomonas vaginalis, Candida albicans, PCR - Swab, Vagina  -     Liquid-based Pap Smear, Screening    Well woman exam with routine gynecological exam  -     Gardnerella vaginalis, Trichomonas vaginalis, Candida albicans, PCR - Swab, Vagina  -     IGP, Rfx Aptima HPV ASCU; Future    Screening mammogram, encounter for  -     Mammo screening digital tomosynthesis bilateral w CAD; Future    Breast mass  Comments:  right breast @ 11 o'clock 2 cm, left breast @ 5 o'clock  Orders:  -     Mammography screening bilateral; Future    Family history of breast cancer  Comments:  sister          Follow up: 1 week(s)    Radha Gillespie, APRN  5/16/2018

## 2018-05-16 NOTE — PROGRESS NOTES
Subjective   Sondra Felder is a 57 y.o. female.     Patient presents today to discuss + HCG on home pregnancy test in the setting of PMB.  Patient noted a scant amount of spotting after LMP: about 10 years ago with endometrial ablation.  Discussed quant HCG of 7 with negative UPT in office as being most consistent with a false + result.  We discussed other potential causes of + HCG in this setting including scant HCG production at the pituitary gland in post menopausal setting or potential neoplasia.  The chance of ovarian neoplasia is also expected to be especially low as she just had a normal US showing no adnexal mass.  US also showed endometrial stripe of 3 mm consistent with atrophy as the cause for PMB.  Discussed recommendation for f/u in 1 year.  Patient has also noted a breast mass and lost a sister to breast cancer. Imaging was completed today including mammogram and breast US, discussed recommendation for surgery referral for possible breast Bx.        The following portions of the patient's history were reviewed and updated as appropriate: allergies, current medications, past family history, past medical history, past social history, past surgical history and problem list.      Review of Systems   Genitourinary: Negative for menstrual problem and vaginal bleeding (brief spotting resolved).       Objective   Physical Exam   Constitutional: She is oriented to person, place, and time. She appears well-developed and well-nourished. No distress.   HENT:   Head: Normocephalic and atraumatic.   Right Ear: External ear normal.   Left Ear: External ear normal.   Nose: Nose normal.   Mouth/Throat: Oropharynx is clear and moist.   Neurological: She is alert and oriented to person, place, and time.   Skin: She is not diaphoretic.   Psychiatric: She has a normal mood and affect. Her behavior is normal. Judgment and thought content normal.   Vitals reviewed.      Assessment/Plan   Sondra was seen today for  follow-up.    Diagnoses and all orders for this visit:    PMB (postmenopausal bleeding)    Elevated serum hCG in female, not pregnant    Breast mass  -     Ambulatory Referral to General Surgery       Clinical picture most consistent with false + HCG and atrophic endometrial spotting  F/u 1 year and PRN  Referral to general surgery for possible breast Bx.

## 2018-05-21 LAB — HPV I/H RISK 4 DNA CVX QL PROBE+SIG AMP: NEGATIVE

## 2019-09-19 ENCOUNTER — OFFICE VISIT (OUTPATIENT)
Dept: ORTHOPEDIC SURGERY | Facility: CLINIC | Age: 59
End: 2019-09-19

## 2019-09-19 VITALS
BODY MASS INDEX: 25.49 KG/M2 | WEIGHT: 135 LBS | TEMPERATURE: 98.1 F | DIASTOLIC BLOOD PRESSURE: 68 MMHG | SYSTOLIC BLOOD PRESSURE: 120 MMHG | OXYGEN SATURATION: 98 % | HEART RATE: 70 BPM | RESPIRATION RATE: 12 BRPM | HEIGHT: 61 IN

## 2019-09-19 DIAGNOSIS — M25.512 CHRONIC LEFT SHOULDER PAIN: Primary | ICD-10-CM

## 2019-09-19 DIAGNOSIS — G89.29 CHRONIC LEFT SHOULDER PAIN: Primary | ICD-10-CM

## 2019-09-19 PROCEDURE — 99203 OFFICE O/P NEW LOW 30 MIN: CPT | Performed by: ORTHOPAEDIC SURGERY

## 2019-09-19 RX ORDER — HYDROCODONE BITARTRATE AND ACETAMINOPHEN 10; 325 MG/1; MG/1
1 TABLET ORAL
COMMUNITY

## 2019-09-19 RX ORDER — GABAPENTIN 100 MG/1
100 CAPSULE ORAL 3 TIMES DAILY
COMMUNITY

## 2019-09-19 NOTE — PROGRESS NOTES
Sondra Felder is a 59 y.o. female   Primary provider:  Twial Jha APRN       Chief Complaint   Patient presents with   • Left Shoulder - Pain, Initial Evaluation       HISTORY OF PRESENT ILLNESS: left shoulder pain.  Patient states she had a injury in 2001 to left shoulder which required surgery. She is here today to get medical cleared for employment. Pain scale 6/10       This is the first office visit for evaluation of left shoulder pain.    Ms. Felder is 59 years old and right-hand dominant.  He said she had an injury to her left shoulder in the remote past and was treated surgically by Dr. Duff for torn tendons in her left shoulder.  She has had problems with her shoulder intermittently since then.  She complains of pain at the apex of the shoulder with radiation to the arm worse with lifting and also with overhead use of the left hand.  Her symptoms are worse with overhead lifting and relieved by hydrocodone.  She is having no active treatment for her shoulder.  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  She was asked to come here for medical clearance for this job.  She said she worked at [employer] last year and more recently was working as a .  She said that she was on work restrictions with regard to the left shoulder but was able to do her regular work at [employer] with no difficulty.  She says her work at her new employer will be very similar to the work she did at [employer].  She denies any numbness or tingling in the extremity.    Medications include 10 mg hydrocodone and Neurontin.  She does not smoke.  Past medical history is remarkable for reflux irritable bowel syndrome depression and anxiety.  She is .        Pain   This is a chronic problem. The current episode started more than 1 year ago. The problem occurs intermittently (severe). The problem has been gradually worsening. Associated symptoms include headaches and joint swelling.  Associated symptoms comments: Aching and burning pain left shoulder. Exacerbated by: driving  She has tried rest for the symptoms. The treatment provided no relief.        CONCURRENT MEDICAL HISTORY:    Past Medical History:   Diagnosis Date   • Adenomatous polyp of colon    • Anxiety    • Carpal tunnel syndrome    • Depression    • Exanthematous disorder    • GERD (gastroesophageal reflux disease)    • Hiatal hernia    • IBS (irritable bowel syndrome)    • Umbilical hernia    • URI (upper respiratory infection)        Allergies   Allergen Reactions   • Codeine Anaphylaxis and Palpitations   • Latex Itching and Anaphylaxis     Causes skin peeling         Current Outpatient Medications:   •  gabapentin (NEURONTIN) 100 MG capsule, Take 100 mg by mouth 3 (Three) Times a Day., Disp: , Rfl:   •  HYDROcodone-acetaminophen (NORCO)  MG per tablet, Take 1 tablet by mouth., Disp: , Rfl:     Past Surgical History:   Procedure Laterality Date   •  SECTION     • ENDOSCOPY  10/25/2002    Hiatal hernia, Veronica's esophagus.   • ENDOSCOPY AND COLONOSCOPY      Hemorrhoids   • INJECTION OF MEDICATION  2010    Kenalog (3)     • INJECTION OF MEDICATION  2009    Rocephin (1)      • SHOULDER SURGERY  2004    Impingement syndrome of the left shoulder. arthroscopic subacromial decompression, left shoulder.   • TUBAL ABDOMINAL LIGATION         Family History   Problem Relation Age of Onset   • Colon cancer Mother    • Breast cancer Sister    • Ovarian cancer Sister    • Ovarian cancer Daughter         Social History     Socioeconomic History   • Marital status:      Spouse name: Not on file   • Number of children: Not on file   • Years of education: Not on file   • Highest education level: Not on file   Tobacco Use   • Smoking status: Never Smoker   • Smokeless tobacco: Never Used   Substance and Sexual Activity   • Alcohol use: No   • Drug use: No   • Sexual activity: Yes     Partners: Male     "    Review of Systems   Cardiovascular:        Irregular heartbeat   Gastrointestinal: Positive for diarrhea.   Musculoskeletal: Positive for joint swelling.        Muscle pain   Neurological: Positive for headaches.   Psychiatric/Behavioral: Positive for sleep disturbance. The patient is nervous/anxious.         Mood swings   All other systems reviewed and are negative.    Review of systems is positive as noted in history of present illness.  PHYSICAL EXAMINATION:       /68   Pulse 70   Temp 98.1 °F (36.7 °C)   Resp 12   Ht 154.9 cm (61\")   Wt 61.2 kg (135 lb)   SpO2 98%   BMI 25.51 kg/m²     Physical Exam she is alert pleasant and in no apparent distress.  She appears somewhat older than her stated age.  She responds appropriately to questions and commands.    GAIT:     [x]  Normal  []  Antalgic    Assistive device: [x]  None  []  Walker     []  Crutches  []  Cane     []  Wheelchair  []  Stretcher    Ortho Exam shoulder motion is smooth with complaints of pain on extremes of motion.  Active elevation measures 140 degrees, external rotation 40 degrees, extension 30 degrees, internal rotation 80 degrees, and external rotation 90 degrees.  There is tenderness diffusely about the shoulder.  Impingement testing is negative.  The strength of internal and external rotation of the shoulder are normal.  Abduction strength is mildly decreased with mild complaints of pain.  Radial pulses strong.  Sensory exam is intact to soft touch.    Previous medical records were briefly reviewed.  Dr. Duff reported arthroscopic subacromial decompression with no complication.  Date of surgery was January 2004.      Xr Shoulder 2+ View Left    Result Date: 9/19/2019  Narrative: Ordering Provider:  Alexis Galdamez MD Ordering Diagnosis/Indication:  Chronic left shoulder pain Procedure:  XR SHOULDER 2+ VW LEFT Exam Date:  9/19/19 COMPARISON: None     Impression:  Radiographs of the left shoulder 2 AP views done today of the " acromiohumeral space is normal.  There is mild degenerative change of the acromioclavicular joint.  There is no significant glenohumeral arthritis.  There is a scalloped appearance of the inferior aspect of the distal shaft of the clavicle which I suspect is postoperative change. Alexis Galdamez MD 9/19/19          ASSESSMENT: Left shoulder pain following acromioplasty.  I find no evidence of rotator cuff tear.  She has limited clinical findings.    The requirements of her new job were reviewed.  These include no lifting overhead.  In my opinion she is capable of performing this job with no other accommodations.    She is not interested in having any more active treatment for her shoulder.  If she develops further symptoms she will call for reevaluation.    Diagnoses and all orders for this visit:    Chronic left shoulder pain  -     XR Shoulder 2+ View Left    Other orders  -     HYDROcodone-acetaminophen (NORCO)  MG per tablet; Take 1 tablet by mouth.  -     gabapentin (NEURONTIN) 100 MG capsule; Take 100 mg by mouth 3 (Three) Times a Day.          PLAN    Return if symptoms worsen or fail to improve.    Alexis Galdamez MD

## 2019-10-28 ENCOUNTER — HOSPITAL ENCOUNTER (EMERGENCY)
Facility: HOSPITAL | Age: 59
Discharge: HOME | End: 2019-10-28
Attending: EMERGENCY MEDICINE | Admitting: EMERGENCY MEDICINE

## 2019-10-28 ENCOUNTER — APPOINTMENT (OUTPATIENT)
Dept: CT IMAGING | Facility: HOSPITAL | Age: 59
End: 2019-10-28

## 2019-10-28 VITALS
DIASTOLIC BLOOD PRESSURE: 58 MMHG | HEART RATE: 52 BPM | WEIGHT: 143.6 LBS | BODY MASS INDEX: 27.11 KG/M2 | TEMPERATURE: 98.2 F | SYSTOLIC BLOOD PRESSURE: 119 MMHG | OXYGEN SATURATION: 100 % | RESPIRATION RATE: 16 BRPM | HEIGHT: 61 IN

## 2019-10-28 DIAGNOSIS — R51.9 NONINTRACTABLE EPISODIC HEADACHE, UNSPECIFIED HEADACHE TYPE: Primary | ICD-10-CM

## 2019-10-28 PROCEDURE — 99282 EMERGENCY DEPT VISIT SF MDM: CPT

## 2019-10-28 PROCEDURE — 70450 CT HEAD/BRAIN W/O DYE: CPT

## 2019-11-20 ENCOUNTER — APPOINTMENT (OUTPATIENT)
Dept: CT IMAGING | Facility: HOSPITAL | Age: 59
End: 2019-11-20

## 2019-11-20 ENCOUNTER — HOSPITAL ENCOUNTER (EMERGENCY)
Facility: HOSPITAL | Age: 59
Discharge: HOME | End: 2019-11-20
Attending: FAMILY MEDICINE | Admitting: FAMILY MEDICINE

## 2019-11-20 VITALS
SYSTOLIC BLOOD PRESSURE: 107 MMHG | DIASTOLIC BLOOD PRESSURE: 53 MMHG | RESPIRATION RATE: 20 BRPM | OXYGEN SATURATION: 96 % | TEMPERATURE: 97.6 F | HEART RATE: 86 BPM | BODY MASS INDEX: 25.49 KG/M2 | HEIGHT: 61 IN | WEIGHT: 135 LBS

## 2019-11-20 DIAGNOSIS — R10.11 RIGHT UPPER QUADRANT ABDOMINAL PAIN: Primary | ICD-10-CM

## 2019-11-20 DIAGNOSIS — N39.0 URINARY TRACT INFECTION WITHOUT HEMATURIA, SITE UNSPECIFIED: ICD-10-CM

## 2019-11-20 LAB
ALBUMIN SERPL-MCNC: 4.1 G/DL (ref 3.5–5.2)
ALBUMIN/GLOB SERPL: 1.3 G/DL
ALP SERPL-CCNC: 76 U/L (ref 39–117)
ALT SERPL W P-5'-P-CCNC: 15 U/L (ref 1–33)
ANION GAP SERPL CALCULATED.3IONS-SCNC: 10 MMOL/L (ref 5–15)
AST SERPL-CCNC: 17 U/L (ref 1–32)
BACTERIA UR QL AUTO: ABNORMAL /HPF
BASOPHILS # BLD AUTO: 0.04 10*3/MM3 (ref 0–0.2)
BASOPHILS NFR BLD AUTO: 0.5 % (ref 0–1.5)
BILIRUB SERPL-MCNC: 0.3 MG/DL (ref 0.2–1.2)
BILIRUB UR QL STRIP: NEGATIVE
BUN BLD-MCNC: 15 MG/DL (ref 6–20)
BUN/CREAT SERPL: 23.8 (ref 7–25)
CALCIUM SPEC-SCNC: 10.3 MG/DL (ref 8.6–10.5)
CHLORIDE SERPL-SCNC: 103 MMOL/L (ref 98–107)
CLARITY UR: ABNORMAL
CO2 SERPL-SCNC: 28 MMOL/L (ref 22–29)
COLOR UR: YELLOW
CREAT BLD-MCNC: 0.63 MG/DL (ref 0.57–1)
DEPRECATED RDW RBC AUTO: 41.9 FL (ref 37–54)
EOSINOPHIL # BLD AUTO: 0.17 10*3/MM3 (ref 0–0.4)
EOSINOPHIL NFR BLD AUTO: 2 % (ref 0.3–6.2)
ERYTHROCYTE [DISTWIDTH] IN BLOOD BY AUTOMATED COUNT: 12.7 % (ref 12.3–15.4)
GFR SERPL CREATININE-BSD FRML MDRD: 97 ML/MIN/1.73
GLOBULIN UR ELPH-MCNC: 3.1 GM/DL
GLUCOSE BLD-MCNC: 82 MG/DL (ref 65–99)
GLUCOSE UR STRIP-MCNC: NEGATIVE MG/DL
HCT VFR BLD AUTO: 38.9 % (ref 34–46.6)
HGB BLD-MCNC: 12.4 G/DL (ref 12–15.9)
HGB UR QL STRIP.AUTO: NEGATIVE
HOLD SPECIMEN: NORMAL
HOLD SPECIMEN: NORMAL
HYALINE CASTS UR QL AUTO: ABNORMAL /LPF
IMM GRANULOCYTES # BLD AUTO: 0.02 10*3/MM3 (ref 0–0.05)
IMM GRANULOCYTES NFR BLD AUTO: 0.2 % (ref 0–0.5)
KETONES UR QL STRIP: NEGATIVE
LEUKOCYTE ESTERASE UR QL STRIP.AUTO: ABNORMAL
LIPASE SERPL-CCNC: 36 U/L (ref 13–60)
LYMPHOCYTES # BLD AUTO: 2.34 10*3/MM3 (ref 0.7–3.1)
LYMPHOCYTES NFR BLD AUTO: 27.8 % (ref 19.6–45.3)
MCH RBC QN AUTO: 28.6 PG (ref 26.6–33)
MCHC RBC AUTO-ENTMCNC: 31.9 G/DL (ref 31.5–35.7)
MCV RBC AUTO: 89.6 FL (ref 79–97)
MONOCYTES # BLD AUTO: 0.68 10*3/MM3 (ref 0.1–0.9)
MONOCYTES NFR BLD AUTO: 8.1 % (ref 5–12)
NEUTROPHILS # BLD AUTO: 5.17 10*3/MM3 (ref 1.7–7)
NEUTROPHILS NFR BLD AUTO: 61.4 % (ref 42.7–76)
NITRITE UR QL STRIP: POSITIVE
NRBC BLD AUTO-RTO: 0 /100 WBC (ref 0–0.2)
PH UR STRIP.AUTO: 6.5 [PH] (ref 5–9)
PLATELET # BLD AUTO: 302 10*3/MM3 (ref 140–450)
PMV BLD AUTO: 9.9 FL (ref 6–12)
POTASSIUM BLD-SCNC: 3.3 MMOL/L (ref 3.5–5.2)
PROT SERPL-MCNC: 7.2 G/DL (ref 6–8.5)
PROT UR QL STRIP: NEGATIVE
RBC # BLD AUTO: 4.34 10*6/MM3 (ref 3.77–5.28)
RBC # UR: ABNORMAL /HPF
REF LAB TEST METHOD: ABNORMAL
SODIUM BLD-SCNC: 141 MMOL/L (ref 136–145)
SP GR UR STRIP: 1.02 (ref 1–1.03)
SQUAMOUS #/AREA URNS HPF: ABNORMAL /HPF
TROPONIN T SERPL-MCNC: <0.01 NG/ML (ref 0–0.03)
UROBILINOGEN UR QL STRIP: ABNORMAL
WBC NRBC COR # BLD: 8.42 10*3/MM3 (ref 3.4–10.8)
WBC UR QL AUTO: ABNORMAL /HPF
WHOLE BLOOD HOLD SPECIMEN: NORMAL
WHOLE BLOOD HOLD SPECIMEN: NORMAL

## 2019-11-20 PROCEDURE — 85025 COMPLETE CBC W/AUTO DIFF WBC: CPT

## 2019-11-20 PROCEDURE — 81001 URINALYSIS AUTO W/SCOPE: CPT

## 2019-11-20 PROCEDURE — 74177 CT ABD & PELVIS W/CONTRAST: CPT

## 2019-11-20 PROCEDURE — 80053 COMPREHEN METABOLIC PANEL: CPT | Performed by: FAMILY MEDICINE

## 2019-11-20 PROCEDURE — 83690 ASSAY OF LIPASE: CPT | Performed by: FAMILY MEDICINE

## 2019-11-20 PROCEDURE — 96374 THER/PROPH/DIAG INJ IV PUSH: CPT

## 2019-11-20 PROCEDURE — 25010000002 IOPAMIDOL 61 % SOLUTION: Performed by: FAMILY MEDICINE

## 2019-11-20 PROCEDURE — 99284 EMERGENCY DEPT VISIT MOD MDM: CPT

## 2019-11-20 PROCEDURE — 25010000002 ONDANSETRON PER 1 MG: Performed by: FAMILY MEDICINE

## 2019-11-20 PROCEDURE — 84484 ASSAY OF TROPONIN QUANT: CPT | Performed by: FAMILY MEDICINE

## 2019-11-20 RX ORDER — ONDANSETRON 2 MG/ML
4 INJECTION INTRAMUSCULAR; INTRAVENOUS ONCE
Status: COMPLETED | OUTPATIENT
Start: 2019-11-20 | End: 2019-11-20

## 2019-11-20 RX ORDER — SULFAMETHOXAZOLE AND TRIMETHOPRIM 800; 160 MG/1; MG/1
1 TABLET ORAL ONCE
Status: COMPLETED | OUTPATIENT
Start: 2019-11-20 | End: 2019-11-20

## 2019-11-20 RX ORDER — SODIUM CHLORIDE 0.9 % (FLUSH) 0.9 %
10 SYRINGE (ML) INJECTION AS NEEDED
Status: DISCONTINUED | OUTPATIENT
Start: 2019-11-20 | End: 2019-11-21 | Stop reason: HOSPADM

## 2019-11-20 RX ORDER — SULFAMETHOXAZOLE AND TRIMETHOPRIM 800; 160 MG/1; MG/1
1 TABLET ORAL 2 TIMES DAILY
Qty: 14 TABLET | Refills: 0 | Status: SHIPPED | OUTPATIENT
Start: 2019-11-20 | End: 2019-11-27

## 2019-11-20 RX ADMIN — SULFAMETHOXAZOLE AND TRIMETHOPRIM 160 MG: 800; 160 TABLET ORAL at 22:38

## 2019-11-20 RX ADMIN — IOPAMIDOL 80 ML: 612 INJECTION, SOLUTION INTRAVENOUS at 21:49

## 2019-11-20 RX ADMIN — ONDANSETRON 4 MG: 2 INJECTION INTRAMUSCULAR; INTRAVENOUS at 22:00

## 2019-11-21 NOTE — ED PROVIDER NOTES
Subjective   59-year-old white female presents the emergency department with complaint of right upper quadrant abdominal pain.  She states that this pain started at approximately 5:30 PM while she was driving.  She has had no previous similar episodes.  She did not think too much about this until she got to work and noticed that the pain was increasing to the point that she was having difficulty walking.  She also started getting nauseated associated with this.  She is been urinating normally.  She is having normal bowel movements.  Only previous abdominal surgeries were  x2 and a tubal ligation.  She still has her appendix and gallbladder.            Review of Systems   Constitutional: Negative for activity change, appetite change, chills, fatigue, fever and unexpected weight change.   HENT: Negative for nosebleeds, rhinorrhea, sore throat, trouble swallowing and voice change.    Eyes: Negative for photophobia, pain and visual disturbance.   Respiratory: Negative for apnea, cough, chest tightness, shortness of breath, wheezing and stridor.    Cardiovascular: Negative for chest pain, palpitations and leg swelling.   Gastrointestinal: Positive for abdominal pain and nausea. Negative for abdominal distention, blood in stool, constipation, diarrhea and vomiting.   Endocrine: Negative for cold intolerance, heat intolerance, polydipsia and polyuria.   Genitourinary: Negative for decreased urine volume, difficulty urinating, dysuria, flank pain, hematuria and urgency.   Musculoskeletal: Negative for arthralgias, myalgias, neck pain and neck stiffness.   Skin: Negative for color change, pallor and rash.   Allergic/Immunologic: Negative for immunocompromised state.   Neurological: Negative for dizziness, seizures, syncope, weakness, light-headedness and numbness.   Hematological: Negative for adenopathy.   Psychiatric/Behavioral: Negative for agitation, confusion, dysphoric mood and suicidal ideas. The patient is  not nervous/anxious.        Past Medical History:   Diagnosis Date   • Adenomatous polyp of colon    • Anxiety    • Carpal tunnel syndrome    • Depression    • Exanthematous disorder    • GERD (gastroesophageal reflux disease)    • Hiatal hernia    • IBS (irritable bowel syndrome)    • Umbilical hernia    • URI (upper respiratory infection)        Allergies   Allergen Reactions   • Codeine Anaphylaxis and Palpitations   • Latex Itching and Anaphylaxis     Causes skin peeling       Past Surgical History:   Procedure Laterality Date   •  SECTION     • ENDOSCOPY  10/25/2002    Hiatal hernia, Veronica's esophagus.   • ENDOSCOPY AND COLONOSCOPY      Hemorrhoids   • INJECTION OF MEDICATION  2010    Kenalog (3)     • INJECTION OF MEDICATION  2009    Rocephin (1)      • SHOULDER SURGERY  2004    Impingement syndrome of the left shoulder. arthroscopic subacromial decompression, left shoulder.   • TUBAL ABDOMINAL LIGATION         Family History   Problem Relation Age of Onset   • Colon cancer Mother    • Breast cancer Sister    • Ovarian cancer Sister    • Ovarian cancer Daughter        Social History     Socioeconomic History   • Marital status:      Spouse name: Not on file   • Number of children: Not on file   • Years of education: Not on file   • Highest education level: Not on file   Tobacco Use   • Smoking status: Never Smoker   • Smokeless tobacco: Never Used   Substance and Sexual Activity   • Alcohol use: No   • Drug use: No   • Sexual activity: Yes     Partners: Male           Objective   Physical Exam   Constitutional: She is oriented to person, place, and time. She appears well-developed and well-nourished. No distress.   HENT:   Head: Normocephalic and atraumatic.   Right Ear: External ear normal.   Left Ear: External ear normal.   Mouth/Throat: Oropharynx is clear and moist. No oropharyngeal exudate.   Eyes: Conjunctivae and EOM are normal. Pupils are equal, round, and reactive to  light. Right eye exhibits no discharge. Left eye exhibits no discharge. No scleral icterus.   Neck: Neck supple. No JVD present. No tracheal deviation present. No thyromegaly present.   Cardiovascular: Normal rate, regular rhythm, normal heart sounds and intact distal pulses. Exam reveals no friction rub.   No murmur heard.  Pulmonary/Chest: Effort normal and breath sounds normal. No stridor. No respiratory distress. She has no wheezes. She has no rales. She exhibits no tenderness.   Abdominal: Soft. Bowel sounds are normal. She exhibits no distension and no mass. There is tenderness. There is no rebound and no guarding.   The abdomen is normal in appearance.  She has tenderness in the right upper quadrant and a positive Gibbons sign.   Musculoskeletal: She exhibits no edema, tenderness or deformity.   Lymphadenopathy:     She has no cervical adenopathy.   Neurological: She is alert and oriented to person, place, and time. No cranial nerve deficit. She exhibits normal muscle tone. Coordination normal.   Skin: Skin is warm and dry. Capillary refill takes 2 to 3 seconds. No rash noted. She is not diaphoretic. No erythema.   Psychiatric: She has a normal mood and affect. Her behavior is normal. Judgment and thought content normal.   Nursing note and vitals reviewed.      Procedures           ED Course  ED Course as of Nov 20 2241 Wed Nov 20, 2019 2229 Patient was placed in room 14 and evaluated by me.  Physical exam was concerning for right upper quadrant pathology but her CT was normal.  Labs were obtained and negative except for urinary tract infection.  She was given Bactrim in the emergency department and will be discharged on the same.  At this point, I believe she is medically stable for discharge and will follow up with her primary care provider as an outpatient.  [CE]      ED Course User Index  [CE] Abdelrahman Dennis,         Labs Reviewed   COMPREHENSIVE METABOLIC PANEL - Abnormal; Notable for the  following components:       Result Value    Potassium 3.3 (*)     All other components within normal limits    Narrative:     GFR Normal >60  Chronic Kidney Disease <60  Kidney Failure <15   URINALYSIS W/ MICROSCOPIC IF INDICATED (NO CULTURE) - Abnormal; Notable for the following components:    Appearance, UA Cloudy (*)     Leuk Esterase, UA Moderate (2+) (*)     Nitrite, UA Positive (*)     All other components within normal limits   URINALYSIS, MICROSCOPIC ONLY - Abnormal; Notable for the following components:    RBC, UA 0-2 (*)     WBC, UA 6-12 (*)     Bacteria, UA 4+ (*)     Squamous Epithelial Cells, UA 3-5 (*)     All other components within normal limits   LIPASE - Normal   TROPONIN (IN-HOUSE) - Normal    Narrative:     Troponin T Reference Range:  <= 0.03 ng/mL-   Negative for AMI  >0.03 ng/mL-     Abnormal for myocardial necrosis.  Clinicians would have to utilize clinical acumen, EKG, Troponin and serial changes to determine if it is an Acute Myocardial Infarction or myocardial injury due to an underlying chronic condition.    CBC WITH AUTO DIFFERENTIAL - Normal   RAINBOW DRAW    Narrative:     The following orders were created for panel order [facility] Draw.  Procedure                               Abnormality         Status                     ---------                               -----------         ------                     Light Blue Top[[id number]]                                   Final result               Green Top (Gel)[[id number]]                                  Final result               Lavender Top[[id number]]                                     Final result               Gold Top - SST[[id number]]                                   Final result                 Please view results for these tests on the individual orders.   CBC AND DIFFERENTIAL    Narrative:     The following orders were created for panel order CBC & Differential.  Procedure                               Abnormality          Status                     ---------                               -----------         ------                     CBC Auto Differential[[id number]]        Normal              Final result                 Please view results for these tests on the individual orders.   LIGHT BLUE TOP   GREEN TOP   LAVENDER TOP   GOLD TOP - SST     Ct Head Without Contrast    Result Date: 10/28/2019  Narrative: CT Head Without Contrast History: Headache. Left-sided headache and vision changes. Axial scans of the brain were obtained without intravenous contrast.  Coronal and sagital reconstructions were preformed. This exam was performed according to our departmental dose-optimization program, which includes automated exposure control, adjustment of the mA and/or kV according to patient size and/or use of iterative reconstruction technique. DLP: 890.00 Comparison: February 28, 2017. Findings: Bone windows are unremarkable. The visualized paranasal sinuses are unremarkable. No acute process. Minimal cerebral atrophy. No hemorrhage. No mass. No abnormal areas of increased or decreased attenuation. No midline shift. No abnormal extra-axial fluid collections.     Impression: CONCLUSION: No acute process. Minimal cerebral atrophy. [id number] Electronically signed by:  Dontae Joseph MD  10/28/2019 6:17 PM CDT Workstation: [id number]    Ct Abdomen Pelvis With Contrast    Result Date: 11/20/2019  Narrative: CT abdomen and pelvis with contrast on  11/20/2019 CLINICAL INDICATION: Right upper quadrant abdominal pain TECHNIQUE: Multiple axial images are obtained throughout the abdomen and pelvis following the administration of IV contrast, 80 mL of Isovue-300 contrast was administered intravenously without complication. This exam was performed according to our departmental dose-optimization program, which includes automated exposure control, adjustment of the mA and/or kV according to patient size and/or use of iterative reconstruction technique.  Total DLP is 293.2 mGy*cm. COMPARISON: CT pelvis from 3/1/2017 FINDINGS: Abdomen: There is minimal bibasilar atelectasis. There is a very small hiatal hernia. There is evidence of calcified granulomatous disease in the lung bases. There is benign cystic lesion inferior to the spleen that is homogeneous and well-circumscribed. This is of unknown definite etiology but not worrisome. The solid abdominal organs are unremarkable. There is no abdominal adenopathy. There is no free fluid or free air within the abdomen. The abdominal portion of the GI tract is unremarkable. Pelvis: There is no free fluid in the pelvis. Pelvic organs appear unremarkable by CT. There is no pelvic adenopathy. Pelvic portion of the GI tract including the appendix is unremarkable. No acute bony abnormality is noted.     Impression: Essentially unremarkable exam. Electronically signed by:  Dixon Washington  11/20/2019 10:04 PM [facility] Workstation: [id number]            [facility]    Final diagnoses:   Right upper quadrant abdominal pain   Urinary tract infection without hematuria, site unspecified              Abdelrahman Dennis,   11/20/19 2248

## 2019-11-21 NOTE — ED NOTES
Patient states having right upper  abdominal pain since 530 that is getting more intense     Nidia Pedersen, RN  11/20/19 2046

## 2020-05-19 ENCOUNTER — TRANSCRIBE ORDERS (OUTPATIENT)
Dept: ORTHOPEDIC SURGERY | Facility: CLINIC | Age: 60
End: 2020-05-19

## 2020-05-19 DIAGNOSIS — M25.512 ACUTE PAIN OF LEFT SHOULDER: Primary | ICD-10-CM

## 2020-05-28 ENCOUNTER — OFFICE VISIT (OUTPATIENT)
Dept: ORTHOPEDIC SURGERY | Facility: CLINIC | Age: 60
End: 2020-05-28

## 2020-05-28 VITALS
HEART RATE: 82 BPM | TEMPERATURE: 98.9 F | OXYGEN SATURATION: 97 % | SYSTOLIC BLOOD PRESSURE: 110 MMHG | HEIGHT: 61 IN | DIASTOLIC BLOOD PRESSURE: 82 MMHG | BODY MASS INDEX: 27.34 KG/M2 | WEIGHT: 144.8 LBS

## 2020-05-28 DIAGNOSIS — M75.52 SUBACROMIAL BURSITIS OF LEFT SHOULDER JOINT: ICD-10-CM

## 2020-05-28 DIAGNOSIS — M25.512 CHRONIC LEFT SHOULDER PAIN: Primary | ICD-10-CM

## 2020-05-28 DIAGNOSIS — G89.29 CHRONIC LEFT SHOULDER PAIN: Primary | ICD-10-CM

## 2020-05-28 PROCEDURE — 20610 DRAIN/INJ JOINT/BURSA W/O US: CPT | Performed by: ORTHOPAEDIC SURGERY

## 2020-05-28 PROCEDURE — 99213 OFFICE O/P EST LOW 20 MIN: CPT | Performed by: ORTHOPAEDIC SURGERY

## 2020-05-28 RX ORDER — BUPIVACAINE HYDROCHLORIDE 5 MG/ML
3 INJECTION, SOLUTION PERINEURAL
Status: COMPLETED | OUTPATIENT
Start: 2020-05-28 | End: 2020-05-28

## 2020-05-28 RX ORDER — TRIAMCINOLONE ACETONIDE 40 MG/ML
80 INJECTION, SUSPENSION INTRA-ARTICULAR; INTRAMUSCULAR
Status: COMPLETED | OUTPATIENT
Start: 2020-05-28 | End: 2020-05-28

## 2020-05-28 RX ORDER — LIDOCAINE HYDROCHLORIDE AND EPINEPHRINE 10; 10 MG/ML; UG/ML
2 INJECTION, SOLUTION INFILTRATION; PERINEURAL
Status: COMPLETED | OUTPATIENT
Start: 2020-05-28 | End: 2020-05-28

## 2020-05-28 RX ADMIN — TRIAMCINOLONE ACETONIDE 80 MG: 40 INJECTION, SUSPENSION INTRA-ARTICULAR; INTRAMUSCULAR at 14:05

## 2020-05-28 RX ADMIN — BUPIVACAINE HYDROCHLORIDE 3 ML: 5 INJECTION, SOLUTION PERINEURAL at 14:05

## 2020-05-28 RX ADMIN — LIDOCAINE HYDROCHLORIDE AND EPINEPHRINE 2 ML: 10; 10 INJECTION, SOLUTION INFILTRATION; PERINEURAL at 14:05

## 2020-05-28 NOTE — PROGRESS NOTES
Sondra Felder is a 59 y.o. female   Primary provider:  Ana Victoria APRN       Chief Complaint   Patient presents with   • Left Shoulder - Pain   • Establish Care     Xray at [facility] in [place] [address]       HISTORY OF PRESENT ILLNESS:  turns today for evaluation of left shoulder pain.  Her symptoms have not improved significantly since I saw her in September of last year.  There is been no trauma.  She is currently unemployed and says she is trying to get disability for multiple conditions.  She complains of intermittent pain from the apex of the shoulder to the elbow worse at night and worse with elevation.    Pain   This is a chronic problem. Episode onset: Several years ago. The problem occurs intermittently. Associated symptoms include headaches and numbness. Associated symptoms comments: Stabbing, aching. Exacerbated by: sitting, driving. She has tried rest for the symptoms. The treatment provided mild relief.        CONCURRENT MEDICAL HISTORY:    Past Medical History:   Diagnosis Date   • Adenomatous polyp of colon    • Anxiety    • Carpal tunnel syndrome    • Depression    • Diabetes (CMS/HCC)    • Exanthematous disorder    • GERD (gastroesophageal reflux disease)    • Hiatal hernia    • IBS (irritable bowel syndrome)    • Umbilical hernia    • URI (upper respiratory infection)        Allergies   Allergen Reactions   • Codeine Anaphylaxis and Palpitations   • Latex Itching and Anaphylaxis     Causes skin peeling         Current Outpatient Medications:   •  Cariprazine HCl (Vraylar) 3 MG capsule capsule, Take  by mouth Daily., Disp: , Rfl:   •  gabapentin (NEURONTIN) 100 MG capsule, Take 100 mg by mouth 3 (Three) Times a Day., Disp: , Rfl:   •  HYDROcodone-acetaminophen (NORCO)  MG per tablet, Take 1 tablet by mouth., Disp: , Rfl:     Past Surgical History:   Procedure Laterality Date   •  SECTION     • ENDOSCOPY  10/25/2002    Hiatal hernia, Veronica's esophagus.   •  "ENDOSCOPY AND COLONOSCOPY      Hemorrhoids   • INJECTION OF MEDICATION  06/28/2010    Kenalog (3)     • INJECTION OF MEDICATION  08/12/2009    Rocephin (1)      • SHOULDER SURGERY  01/12/2004    Impingement syndrome of the left shoulder. arthroscopic subacromial decompression, left shoulder., dr Duff   • TUBAL ABDOMINAL LIGATION         Family History   Problem Relation Age of Onset   • Colon cancer Mother    • Breast cancer Sister    • Ovarian cancer Sister    • Ovarian cancer Daughter         Social History     Socioeconomic History   • Marital status:      Spouse name: Not on file   • Number of children: Not on file   • Years of education: Not on file   • Highest education level: Not on file   Tobacco Use   • Smoking status: Never Smoker   • Smokeless tobacco: Never Used   Substance and Sexual Activity   • Alcohol use: No   • Drug use: No   • Sexual activity: Yes     Partners: Male        Review of Systems   Constitutional: Negative.    HENT: Positive for tinnitus.    Eyes: Negative.    Respiratory: Negative.    Cardiovascular: Negative.    Gastrointestinal: Negative.    Endocrine: Negative.    Genitourinary: Negative.    Musculoskeletal: Negative.         Muscle pain   Skin: Negative.    Allergic/Immunologic: Negative.    Neurological: Positive for dizziness, numbness and headaches.        Tingling   Hematological: Negative.    Psychiatric/Behavioral: Positive for sleep disturbance. The patient is nervous/anxious.         Depression     Review of systems positive as noted above.    PHYSICAL EXAMINATION:       Vitals:    05/28/20 1343   BP: 110/82   BP Location: Left arm   Patient Position: Sitting   Pulse: 82   Temp: 98.9 °F (37.2 °C)   TempSrc: Temporal   SpO2: 97%   Weight: 65.7 kg (144 lb 12.8 oz)   Height: 154.9 cm (61\")   PainSc:   7   Body mass index is 27.36 kg/m².      Physical Exam she is alert and in no apparent distress at rest.  GAIT:     [x]  Normal  []  Antalgic    Assistive device: [x]  " None  []  Walker     []  Crutches  []  Cane     []  Wheelchair  []  Stretcher    Ortho Exam active elevation of the left shoulder is smooth but painfully restricted to 135 degrees with active assisted elevation 150 degrees.  There is tenderness diffusely about the shoulder but no apparent acromioclavicular tenderness.  Strength of internal and external rotation are each 4/5 with complaints of pain and abduction strength is 4-.  She has full external rotation.  Biceps contours are normal.    Radiographs of the shoulder done previously are unchanged from previous studies unremarkable primarily for acromioclavicular arthritis.      No results found.                   ASSESSMENT: Chronic left shoulder pain.  Her symptoms are those of rotator cuff tendinitis.  I think it is unlikely she has a rotator cuff tear.    Prognosis would appear to be quite poor for long-term control of her symptoms.  She was given Thera-Band and instructed in strengthening exercises for the shoulder rotators.  Potential benefits of injection therapy were discussed.  She wished to proceed with this.    The left shoulder was prepped.  Skin was infiltrated with 1% Xylocaine with epinephrine.  The subacromial bursa was injected with a mixture of Marcaine Kenalog and Xylocaine with epinephrine.  There were no complications.      Return here as needed.    Diagnoses and all orders for this visit:    Chronic left shoulder pain  -     Large Joint Arthrocentesis: L subacromial bursa    Subacromial bursitis of left shoulder joint    Other orders  -     Cariprazine HCl (Vraylar) 3 MG capsule capsule; Take  by mouth Daily.        Large Joint Arthrocentesis: L subacromial bursa  Date/Time: 5/28/2020 2:05 PM  Consent given by: patient  Site marked: site marked  Timeout: Immediately prior to procedure a time out was called to verify the correct patient, procedure, equipment, support staff and site/side marked as required   Supporting Documentation  Indications:  pain   Procedure Details  Location: shoulder - L subacromial bursa  Preparation: Patient was prepped and draped in the usual sterile fashion  Needle size: 22 G  Approach: posterior  Medications administered: 2 mL lidocaine-EPINEPHrine 1 %-1:[id number]; 3 mL bupivacaine 0.5 %; 80 mg triamcinolone acetonide 40 MG/ML  Patient tolerance: patient tolerated the procedure well with no immediate complications          PLAN    Patient's Body mass index is 27.36 kg/m². BMI is within normal parameters. No follow-up required..      Return if symptoms worsen or fail to improve.    Alexis Galdamez MD

## 2020-06-11 ENCOUNTER — OFFICE VISIT (OUTPATIENT)
Dept: ORTHOPEDIC SURGERY | Facility: CLINIC | Age: 60
End: 2020-06-11

## 2020-06-11 VITALS — HEART RATE: 70 BPM | BODY MASS INDEX: 26.68 KG/M2 | HEIGHT: 61 IN | OXYGEN SATURATION: 98 % | WEIGHT: 141.3 LBS

## 2020-06-11 DIAGNOSIS — M75.52 SUBACROMIAL BURSITIS OF LEFT SHOULDER JOINT: ICD-10-CM

## 2020-06-11 DIAGNOSIS — G89.29 CHRONIC LEFT SHOULDER PAIN: Primary | ICD-10-CM

## 2020-06-11 DIAGNOSIS — M25.512 CHRONIC LEFT SHOULDER PAIN: Primary | ICD-10-CM

## 2020-06-11 PROCEDURE — 99213 OFFICE O/P EST LOW 20 MIN: CPT | Performed by: ORTHOPAEDIC SURGERY

## 2020-06-11 NOTE — PROGRESS NOTES
"Sondra Felder is a 60 y.o. female returns for     Chief Complaint   Patient presents with   • Left Shoulder - Follow-up       HISTORY OF PRESENT ILLNESS: Patient being seen for left shoulder follow up. Injection given 05/28/2020. Reports no injury. Reports injection helped some.     Mrs. Felder had little if any benefit from her injection.  She continues to complain of pain in the shoulder and arm.  She also describes occasional twitching of her left hand.  It is unclear if this is related to her shoulder symptoms.       CONCURRENT MEDICAL HISTORY:    The following portions of the patient's history were reviewed and updated as appropriate: allergies, current medications, past family history, past medical history, past social history, past surgical history and problem list.         PHYSICAL EXAMINATION:       Pulse 70   Ht 154.9 cm (61\")   Wt 64.1 kg (141 lb 4.8 oz)   SpO2 98%   BMI 26.70 kg/m²     Physical Exam he is alert and in no apparent distress.    GAIT:     [x]  Normal  []  Antalgic    Assistive device: [x]  None  []  Walker     []  Crutches  []  Cane     []  Wheelchair  []  Stretcher    Ortho Exam active elevation of the left shoulder is smooth but painfully restricted to 135 degrees.  There is mild to moderate tenderness diffusely about the shoulder with a moderately positive flinch sign.  Impingement testing produces no apparent pain.  Strength of abduction is only mildly decreased with complaints of pain.      No results found.          ASSESSMENT: Left shoulder pain of uncertain cause.  She has limited clinical findings.    The uncertainty of the diagnosis was discussed with her.  She was reassured she may advance activities as tolerated.  At this point I think is very unlikely surgery will be of any benefit for her.  However if she finds her condition unacceptable to her I would recommend proceeding with MRI scan to see if she has any surgical indications.  She would like to consider this " further.    Return here as needed.    Diagnoses and all orders for this visit:    Chronic left shoulder pain    Subacromial bursitis of left shoulder joint          PLAN    Patient's Body mass index is 26.7 kg/m². BMI is within normal parameters. No follow-up required..        No follow-ups on file.    Marlena iVctoria MA

## 2020-06-26 DIAGNOSIS — G89.29 CHRONIC LEFT SHOULDER PAIN: Primary | ICD-10-CM

## 2020-06-26 DIAGNOSIS — M75.52 SUBACROMIAL BURSITIS OF LEFT SHOULDER JOINT: ICD-10-CM

## 2020-06-26 DIAGNOSIS — M25.512 CHRONIC LEFT SHOULDER PAIN: Primary | ICD-10-CM

## 2020-07-06 ENCOUNTER — HOSPITAL ENCOUNTER (OUTPATIENT)
Dept: MRI IMAGING | Facility: HOSPITAL | Age: 60
Discharge: HOME | End: 2020-07-06
Admitting: ORTHOPAEDIC SURGERY

## 2020-07-06 DIAGNOSIS — M25.512 CHRONIC LEFT SHOULDER PAIN: ICD-10-CM

## 2020-07-06 DIAGNOSIS — G89.29 CHRONIC LEFT SHOULDER PAIN: ICD-10-CM

## 2020-07-06 DIAGNOSIS — M75.52 SUBACROMIAL BURSITIS OF LEFT SHOULDER JOINT: ICD-10-CM

## 2020-07-06 PROCEDURE — 73221 MRI JOINT UPR EXTREM W/O DYE: CPT

## 2020-07-10 ENCOUNTER — OFFICE VISIT (OUTPATIENT)
Dept: ORTHOPEDIC SURGERY | Facility: CLINIC | Age: 60
End: 2020-07-10

## 2020-07-10 VITALS
BODY MASS INDEX: 26.62 KG/M2 | RESPIRATION RATE: 18 BRPM | TEMPERATURE: 98.6 F | HEIGHT: 61 IN | WEIGHT: 141 LBS | OXYGEN SATURATION: 98 % | HEART RATE: 80 BPM

## 2020-07-10 DIAGNOSIS — M25.512 CHRONIC LEFT SHOULDER PAIN: Primary | ICD-10-CM

## 2020-07-10 DIAGNOSIS — G89.29 CHRONIC LEFT SHOULDER PAIN: Primary | ICD-10-CM

## 2020-07-10 PROCEDURE — 99214 OFFICE O/P EST MOD 30 MIN: CPT | Performed by: ORTHOPAEDIC SURGERY

## 2020-07-10 NOTE — PROGRESS NOTES
"Sondra Felder is a 60 y.o. female returns for     Chief Complaint   Patient presents with   • Left Shoulder - Follow-up, Pain   • Results     07/06/20  MRI Shoulder Left Without Contrast        HISTORY OF PRESENT ILLNESS: Mri results.  Pain scale today7/10    Mrs. Felder continues to have pain in the left shoulder and arm.  As noted previously this is a chronic problem.     CONCURRENT MEDICAL HISTORY:    The following portions of the patient's history were reviewed and updated as appropriate: allergies, current medications, past family history, past medical history, past social history, past surgical history and problem list.         PHYSICAL EXAMINATION:       Pulse 80   Temp 98.6 °F (37 °C) (Temporal)   Resp 18   Ht 154.9 cm (61\")   Wt 64 kg (141 lb)   LMP  (LMP Unknown)   SpO2 98%   BMI 26.64 kg/m²     Physical Exam she is alert and in no apparent distress at rest.      GAIT:     [x]  Normal  []  Antalgic    Assistive device: [x]  None  []  Walker     []  Crutches  []  Cane     []  Wheelchair  []  Stretcher    Ortho Exam active elevation of the left shoulder is smooth but painfully restricted to about 70 degrees.  Active assisted elevation is to about 110 degrees.  Impingement testing produces no apparent pain.  There is tenderness diffusely about the shoulder with a mildly positive flinch sign.    MRI scan of the shoulder done recently was reviewed.  I see no evidence of rotator cuff tear.  There is moderate degenerative change of the acromioclavicular joint.  There is cystic change in the anterior glenoid probably degenerative in nature.      Mri Shoulder Left Without Contrast    Result Date: 7/6/2020  Narrative: MRI of the left shoulder without contrast HISTORY: Left shoulder pain. Chronic pain. Prior left shoulder surgery. Pain since reaching backwards one month ago. Subacromial bursitis of the left shoulder. Multisequence multiplanar images of the left shoulder were obtained without contrast. " COMPARISON: None. Correlation with radiographs of September 19, 2019. FINDINGS: Hypertrophic change acromioclavicular joint with some impingement upon the supraspinatus musculotendinous junction. Supraspinatus tendinosis. Minimal degenerative cystic change superior lateral aspect aspect humeral head and degenerative cystic change inferior aspect of the glenoid. No evidence of tear of the supraspinatus tendon. The subscapularis tendon is intact. The glenoid labrum appears intact. The bicipital tendon lies in the bicipital tendon groove. No joint effusion.     Impression: CONCLUSION: Hypertrophic change acromioclavicular joint with some impingement upon the supraspinatus musculotendinous junction. Supraspinatus tendinosis. Minimal degenerative cystic change superior lateral aspect aspect humeral head and degenerative cystic change inferior aspect of the glenoid. [id number] Electronically signed by:  Dontae Joseph MD  7/6/2020 4:49 PM CDT Workstation: [id number]            ASSESSMENT: Left shoulder pain.  Her symptoms appear to be somewhat out of proportion to clinical findings.    Treatment options were reviewed.  Potential benefits of surgical treatment were discussed.  However considering her MRI scan findings I think the odds are fair at best that the surgery would result in relief of her symptoms.  She is scheduled to have an evaluation for disability in the near future.  I suggested she defer any decisions concerning surgery for the shoulder until after her disability evaluation.  She was agreeable with this.    She was instructed in symptomatic care.    Return here in 6 weeks for clinical exam.    Diagnoses and all orders for this visit:    Chronic left shoulder pain          PLAN    Return in about 6 weeks (around 8/21/2020).    Alexis Galdamez MD

## 2020-08-25 ENCOUNTER — OFFICE VISIT (OUTPATIENT)
Dept: ORTHOPEDIC SURGERY | Facility: CLINIC | Age: 60
End: 2020-08-25

## 2020-08-25 VITALS — HEART RATE: 62 BPM | OXYGEN SATURATION: 99 % | HEIGHT: 61 IN | BODY MASS INDEX: 26.62 KG/M2 | WEIGHT: 141 LBS

## 2020-08-25 DIAGNOSIS — M25.512 CHRONIC LEFT SHOULDER PAIN: Primary | ICD-10-CM

## 2020-08-25 DIAGNOSIS — G89.29 CHRONIC LEFT SHOULDER PAIN: Primary | ICD-10-CM

## 2020-08-25 PROCEDURE — 99213 OFFICE O/P EST LOW 20 MIN: CPT | Performed by: ORTHOPAEDIC SURGERY

## 2020-08-25 NOTE — PROGRESS NOTES
"Sondra Felder is a 60 y.o. female returns for     Chief Complaint   Patient presents with   • Left Shoulder - Follow-up, Pain       HISTORY OF PRESENT ILLNESS:6 week follow up left shoulder pain.  Pain scale today 6/10    Mrs. Felder has had no change in her pain symptoms.  She said she has had pain in the shoulder ever since her surgery in 2004.     CONCURRENT MEDICAL HISTORY:    The following portions of the patient's history were reviewed and updated as appropriate: allergies, current medications, past family history, past medical history, past social history, past surgical history and problem list.         PHYSICAL EXAMINATION:       Pulse 62   Ht 154.9 cm (61\")   Wt 64 kg (141 lb)   LMP  (LMP Unknown)   SpO2 99%   BMI 26.64 kg/m²     Physical Exam she is alert and in no apparent distress.    GAIT:     [x]  Normal  []  Antalgic    Assistive device: [x]  None  []  Walker     []  Crutches  []  Cane     []  Wheelchair  []  Stretcher    Ortho Exam left shoulder motion is smooth.  Active elevation is to about 150 degrees.  Impingement testing is negative.  There is mild tenderness diffusely about the shoulder.    No results found.          ASSESSMENT: Chronic left shoulder pain.  Based on the findings of her MRI scan I think the results of surgery would be unpredictable.  She was instructed in symptomatic care.  I suggested obtaining a second opinion.  She may continue activities as tolerated.    Return here as needed.    Diagnoses and all orders for this visit:    Chronic left shoulder pain          PLAN        Patient's Body mass index is 26.64 kg/m². BMI is within normal parameters. No follow-up required..  Return if symptoms worsen or fail to improve.    Alexis Galdamez MD  "

## 2020-09-12 ENCOUNTER — HOSPITAL ENCOUNTER (EMERGENCY)
Facility: HOSPITAL | Age: 60
Discharge: HOME | End: 2020-09-13
Attending: EMERGENCY MEDICINE | Admitting: EMERGENCY MEDICINE

## 2020-09-12 DIAGNOSIS — Z71.1 PERSON WITH FEARED COMPLAINT IN WHOM NO DIAGNOSIS IS MADE: Primary | ICD-10-CM

## 2020-09-12 PROCEDURE — 99283 EMERGENCY DEPT VISIT LOW MDM: CPT

## 2020-09-13 VITALS
TEMPERATURE: 98 F | HEIGHT: 61 IN | OXYGEN SATURATION: 97 % | DIASTOLIC BLOOD PRESSURE: 64 MMHG | HEART RATE: 51 BPM | SYSTOLIC BLOOD PRESSURE: 116 MMHG | BODY MASS INDEX: 27.38 KG/M2 | WEIGHT: 145 LBS | RESPIRATION RATE: 18 BRPM

## 2020-09-13 NOTE — ED PROVIDER NOTES
Subjective   60 year old female presents to the ED with complaint of edema. She reports this is an ongoing problem. She reports bilateral legs up to the hips and abdomen swollen. She states she was told it was caused by her sciatica. Denies chest pain, shortness of breath, fever or chills. Cannot report anything that seems to make it better or worse.     Family history, surgical history, social history, current medications and allergies are reviewed with the patient and triage documentation and vitals are reviewed.        History provided by:  Patient and relative   used: No        Review of Systems   Constitutional: Negative for chills and fever.   HENT: Negative for congestion and sore throat.    Eyes: Negative for photophobia and visual disturbance.   Respiratory: Negative for cough, shortness of breath and wheezing.    Cardiovascular: Positive for leg swelling.   Gastrointestinal: Positive for abdominal distention. Negative for nausea and vomiting.   Endocrine: Negative for polydipsia, polyphagia and polyuria.   Genitourinary: Negative for frequency and urgency.   Musculoskeletal: Negative for arthralgias, back pain, myalgias and neck pain.   Skin: Negative for rash and wound.   Allergic/Immunologic: Negative.    Neurological: Negative for weakness, light-headedness, numbness and headaches.   Hematological: Negative.    Psychiatric/Behavioral: Negative.        Past Medical History:   Diagnosis Date   • Adenomatous polyp of colon    • Anxiety    • Carpal tunnel syndrome    • Depression    • Diabetes (CMS/HCC)    • Exanthematous disorder    • GERD (gastroesophageal reflux disease)    • Hiatal hernia    • IBS (irritable bowel syndrome)    • Umbilical hernia    • URI (upper respiratory infection)        Allergies   Allergen Reactions   • Codeine Anaphylaxis and Palpitations   • Latex Itching and Anaphylaxis     Causes skin peeling       Past Surgical History:   Procedure Laterality Date   •   SECTION     • ENDOSCOPY  10/25/2002    Hiatal hernia, Veronica's esophagus.   • ENDOSCOPY AND COLONOSCOPY      Hemorrhoids   • INJECTION OF MEDICATION  2010    Kenalog (3)     • INJECTION OF MEDICATION  2009    Rocephin (1)      • SHOULDER SURGERY  2004    Impingement syndrome of the left shoulder. arthroscopic subacromial decompression, left shoulder., dr Duff   • TUBAL ABDOMINAL LIGATION         Family History   Problem Relation Age of Onset   • Colon cancer Mother    • Breast cancer Sister    • Ovarian cancer Sister    • Ovarian cancer Daughter        Social History     Socioeconomic History   • Marital status:      Spouse name: Not on file   • Number of children: Not on file   • Years of education: Not on file   • Highest education level: Not on file   Tobacco Use   • Smoking status: Never Smoker   • Smokeless tobacco: Never Used   Substance and Sexual Activity   • Alcohol use: No   • Drug use: No   • Sexual activity: Yes     Partners: Male           Objective   Physical Exam  Vitals signs and nursing note reviewed.   Constitutional:       General: She is not in acute distress.     Appearance: Normal appearance. She is not ill-appearing.   HENT:      Head: Normocephalic.   Eyes:      Extraocular Movements: Extraocular movements intact.      Pupils: Pupils are equal, round, and reactive to light.   Neck:      Musculoskeletal: Normal range of motion.   Cardiovascular:      Rate and Rhythm: Normal rate and regular rhythm.      Pulses: Normal pulses.      Heart sounds: No murmur.   Pulmonary:      Effort: Pulmonary effort is normal.      Breath sounds: Normal breath sounds.   Abdominal:      General: Abdomen is flat. There is no distension.      Palpations: Abdomen is soft.      Tenderness: There is no abdominal tenderness. There is no guarding or rebound.   Musculoskeletal: Normal range of motion.   Skin:     General: Skin is warm and dry.      Capillary Refill: Capillary refill  takes less than 2 seconds.   Neurological:      General: No focal deficit present.      Mental Status: She is alert and oriented to person, place, and time.   Psychiatric:         Mood and Affect: Mood normal.         Behavior: Behavior normal.         Procedures  none         ED Course    Labs Reviewed - No data to display  No results found.          MDM  Number of Diagnoses or Management Options  Person with feared complaint in whom no diagnosis is made:   Patient Progress  Patient progress: stable    Patient offered workup for peripheral edema versus CHF/central cause, however no edema or swelling noted on exam. No signs of CHF. Patient reassured. Declines further workup at this time.       Final diagnoses:   Person with feared complaint in whom no diagnosis is made            Carl Moseley, DO  09/14/20 [id number]

## 2020-10-01 ENCOUNTER — HOSPITAL ENCOUNTER (OUTPATIENT)
Facility: HOSPITAL | Age: 60
Setting detail: HOSPITAL OUTPATIENT SURGERY
End: 2020-10-01
Attending: OPHTHALMOLOGY | Admitting: OPHTHALMOLOGY

## 2020-10-06 ENCOUNTER — LAB (OUTPATIENT)
Dept: LAB | Facility: HOSPITAL | Age: 60
End: 2020-10-06

## 2020-10-06 DIAGNOSIS — Z01.818 PRE-OP TESTING: ICD-10-CM

## 2020-10-06 PROCEDURE — C9803 HOPD COVID-19 SPEC COLLECT: HCPCS

## 2020-10-06 PROCEDURE — U0003 INFECTIOUS AGENT DETECTION BY NUCLEIC ACID (DNA OR RNA); SEVERE ACUTE RESPIRATORY SYNDROME CORONAVIRUS 2 (SARS-COV-2) (CORONAVIRUS DISEASE [COVID-19]), AMPLIFIED PROBE TECHNIQUE, MAKING USE OF HIGH THROUGHPUT TECHNOLOGIES AS DESCRIBED BY CMS-2020-01-R: HCPCS

## 2020-10-07 LAB
COVID LABCORP PRIORITY: NORMAL
SARS-COV-2 RNA RESP QL NAA+PROBE: NOT DETECTED

## 2020-10-09 ENCOUNTER — HOSPITAL ENCOUNTER (OUTPATIENT)
Facility: HOSPITAL | Age: 60
Setting detail: HOSPITAL OUTPATIENT SURGERY
Discharge: HOME | End: 2020-10-09
Attending: OPHTHALMOLOGY | Admitting: OPHTHALMOLOGY

## 2020-10-09 ENCOUNTER — ANESTHESIA (OUTPATIENT)
Dept: PERIOP | Facility: HOSPITAL | Age: 60
End: 2020-10-09

## 2020-10-09 ENCOUNTER — ANESTHESIA EVENT (OUTPATIENT)
Dept: PERIOP | Facility: HOSPITAL | Age: 60
End: 2020-10-09

## 2020-10-09 VITALS
WEIGHT: 145.94 LBS | DIASTOLIC BLOOD PRESSURE: 57 MMHG | BODY MASS INDEX: 27.55 KG/M2 | SYSTOLIC BLOOD PRESSURE: 112 MMHG | RESPIRATION RATE: 18 BRPM | OXYGEN SATURATION: 96 % | HEIGHT: 61 IN | HEART RATE: 75 BPM | TEMPERATURE: 97.5 F

## 2020-10-09 LAB — GLUCOSE BLDC GLUCOMTR-MCNC: 80 MG/DL (ref 70–130)

## 2020-10-09 PROCEDURE — 25010000002 MIDAZOLAM PER 1 MG: Performed by: NURSE ANESTHETIST, CERTIFIED REGISTERED

## 2020-10-09 PROCEDURE — 25010000002 EPINEPHRINE PER 0.1 MG: Performed by: OPHTHALMOLOGY

## 2020-10-09 PROCEDURE — 25010000002 FENTANYL CITRATE (PF) 100 MCG/2ML SOLUTION: Performed by: NURSE ANESTHETIST, CERTIFIED REGISTERED

## 2020-10-09 PROCEDURE — V2632 POST CHMBR INTRAOCULAR LENS: HCPCS | Performed by: OPHTHALMOLOGY

## 2020-10-09 PROCEDURE — 25010000002 VANCOMYCIN 1 G RECONSTITUTED SOLUTION 1 EACH VIAL: Performed by: OPHTHALMOLOGY

## 2020-10-09 PROCEDURE — 82962 GLUCOSE BLOOD TEST: CPT

## 2020-10-09 DEVICE — LENS ACRYSOF IQ 6X13MM SN60WF 23.0: Type: IMPLANTABLE DEVICE | Site: EYE | Status: FUNCTIONAL

## 2020-10-09 RX ORDER — BRIMONIDINE TARTRATE 0.15 %
DROPS OPHTHALMIC (EYE) AS NEEDED
Status: DISCONTINUED | OUTPATIENT
Start: 2020-10-09 | End: 2020-10-09 | Stop reason: HOSPADM

## 2020-10-09 RX ORDER — TETRACAINE HYDROCHLORIDE 5 MG/ML
SOLUTION OPHTHALMIC AS NEEDED
Status: DISCONTINUED | OUTPATIENT
Start: 2020-10-09 | End: 2020-10-09 | Stop reason: HOSPADM

## 2020-10-09 RX ORDER — PHENYLEPHRINE HCL 2.5 %
1 DROPS OPHTHALMIC (EYE)
Status: COMPLETED | OUTPATIENT
Start: 2020-10-09 | End: 2020-10-09

## 2020-10-09 RX ORDER — PREDNISOLONE ACETATE 10 MG/ML
SUSPENSION/ DROPS OPHTHALMIC AS NEEDED
Status: DISCONTINUED | OUTPATIENT
Start: 2020-10-09 | End: 2020-10-09 | Stop reason: HOSPADM

## 2020-10-09 RX ORDER — ONDANSETRON 2 MG/ML
4 INJECTION INTRAMUSCULAR; INTRAVENOUS ONCE AS NEEDED
Status: CANCELLED | OUTPATIENT
Start: 2020-10-09

## 2020-10-09 RX ORDER — SODIUM CHLORIDE 0.9 % (FLUSH) 0.9 %
10 SYRINGE (ML) INJECTION AS NEEDED
Status: DISCONTINUED | OUTPATIENT
Start: 2020-10-09 | End: 2020-10-09 | Stop reason: HOSPADM

## 2020-10-09 RX ORDER — MIDAZOLAM HYDROCHLORIDE 1 MG/ML
INJECTION INTRAMUSCULAR; INTRAVENOUS AS NEEDED
Status: DISCONTINUED | OUTPATIENT
Start: 2020-10-09 | End: 2020-10-09 | Stop reason: SURG

## 2020-10-09 RX ORDER — FENTANYL CITRATE 50 UG/ML
INJECTION, SOLUTION INTRAMUSCULAR; INTRAVENOUS AS NEEDED
Status: DISCONTINUED | OUTPATIENT
Start: 2020-10-09 | End: 2020-10-09 | Stop reason: SURG

## 2020-10-09 RX ORDER — MOXIFLOXACIN 5 MG/ML
SOLUTION/ DROPS OPHTHALMIC AS NEEDED
Status: DISCONTINUED | OUTPATIENT
Start: 2020-10-09 | End: 2020-10-09 | Stop reason: HOSPADM

## 2020-10-09 RX ORDER — TETRACAINE HYDROCHLORIDE 5 MG/ML
1 SOLUTION OPHTHALMIC
Status: COMPLETED | OUTPATIENT
Start: 2020-10-09 | End: 2020-10-09

## 2020-10-09 RX ORDER — CYCLOPENTOLATE HYDROCHLORIDE 20 MG/ML
1 SOLUTION/ DROPS OPHTHALMIC
Status: COMPLETED | OUTPATIENT
Start: 2020-10-09 | End: 2020-10-09

## 2020-10-09 RX ORDER — MEPERIDINE HYDROCHLORIDE 25 MG/ML
12.5 INJECTION INTRAMUSCULAR; INTRAVENOUS; SUBCUTANEOUS
Status: CANCELLED | OUTPATIENT
Start: 2020-10-09 | End: 2020-10-10

## 2020-10-09 RX ORDER — PROMETHAZINE HYDROCHLORIDE 25 MG/1
25 SUPPOSITORY RECTAL ONCE AS NEEDED
Status: CANCELLED | OUTPATIENT
Start: 2020-10-09

## 2020-10-09 RX ORDER — PROMETHAZINE HYDROCHLORIDE 25 MG/1
25 TABLET ORAL ONCE AS NEEDED
Status: CANCELLED | OUTPATIENT
Start: 2020-10-09

## 2020-10-09 RX ADMIN — TETRACAINE HYDROCHLORIDE 1 DROP: 5 SOLUTION OPHTHALMIC at 10:14

## 2020-10-09 RX ADMIN — CYCLOPENTOLATE HYDROCHLORIDE 1 DROP: 20 SOLUTION/ DROPS OPHTHALMIC at 10:14

## 2020-10-09 RX ADMIN — PHENYLEPHRINE HYDROCHLORIDE 1 DROP: 25 SOLUTION/ DROPS OPHTHALMIC at 10:04

## 2020-10-09 RX ADMIN — CYCLOPENTOLATE HYDROCHLORIDE 1 DROP: 20 SOLUTION/ DROPS OPHTHALMIC at 10:04

## 2020-10-09 RX ADMIN — MIDAZOLAM HYDROCHLORIDE 1 MG: 2 INJECTION, SOLUTION INTRAMUSCULAR; INTRAVENOUS at 11:31

## 2020-10-09 RX ADMIN — PHENYLEPHRINE HYDROCHLORIDE 1 DROP: 25 SOLUTION/ DROPS OPHTHALMIC at 09:54

## 2020-10-09 RX ADMIN — CYCLOPENTOLATE HYDROCHLORIDE 1 DROP: 20 SOLUTION/ DROPS OPHTHALMIC at 09:54

## 2020-10-09 RX ADMIN — FENTANYL CITRATE 50 MCG: 50 INJECTION, SOLUTION INTRAMUSCULAR; INTRAVENOUS at 11:31

## 2020-10-09 RX ADMIN — PHENYLEPHRINE HYDROCHLORIDE 1 DROP: 25 SOLUTION/ DROPS OPHTHALMIC at 10:14

## 2020-10-09 RX ADMIN — TETRACAINE HYDROCHLORIDE 1 DROP: 5 SOLUTION OPHTHALMIC at 09:54

## 2020-10-09 NOTE — ANESTHESIA POSTPROCEDURE EVALUATION
Patient: Sondra Felder    Procedure Summary     Date: 10/09/20 Room / Location: [facility] OR 06 / [facility] OR    Anesthesia Start: 1128 Anesthesia Stop: 1141    Procedure: CATARACT PHACO EXTRACTION WITH INTRAOCULAR LENS IMPLANT            (LATEX ALLERGY) (Right Eye) Diagnosis: Age-related nuclear cataract of right eye    Surgeon: Agustin Ferguson MD Provider: Kavitha Nails DO    Anesthesia Type: MAC ASA Status: 3          Anesthesia Type: MAC    Vitals  No vitals data found for the desired time range.          Post Anesthesia Care and Evaluation    Patient location during evaluation: bedside  Patient participation: complete - patient participated  Level of consciousness: sleepy but conscious  Pain score: 0  Pain management: adequate  Airway patency: patent  Anesthetic complications: No anesthetic complications  PONV Status: none  Cardiovascular status: acceptable and stable  Respiratory status: acceptable  Hydration status: stable

## 2020-10-09 NOTE — ANESTHESIA PREPROCEDURE EVALUATION
Anesthesia Evaluation     Patient summary reviewed and Nursing notes reviewed   history of anesthetic complications: prolonged sedation  NPO Solid Status: > 8 hours  NPO Liquid Status: > 8 hours           Airway   Mallampati: II  TM distance: >3 FB  Neck ROM: full  Possible difficult intubation  Dental    (+) edentulous    Pulmonary - normal exam    breath sounds clear to auscultation  (-) asthma, rhonchi, decreased breath sounds, wheezes, not a smoker  Cardiovascular   Exercise tolerance: poor (<4 METS)    Rhythm: regular  Rate: normal    (+) dysrhythmias Bradycardia,   (-) pacemaker, hypertension, valvular problems/murmurs, past MI, CAD, murmur, cardiac stents, CABG, DVT, hyperlipidemia      Neuro/Psych  (+) TIA, psychiatric history Anxiety and Depression,     (-) seizures  GI/Hepatic/Renal/Endo    (+)  hiatal hernia, GERD well controlled,    (-)  obesity, morbid obesity, diabetes    Musculoskeletal     Abdominal  - normal exam  (-) obese   Substance History      OB/GYN negative ob/gyn ROS         Other   arthritis,      ROS/Med Hx Other: Hx of TIA x2- no sequelae- last one was 2015                  Anesthesia Plan    ASA 3     MAC   (Discussed MAC anesthesia with patient & daughter understands possible complications, risks, & agrees.)  intravenous induction     Anesthetic plan, all risks, benefits, and alternatives have been provided, discussed and informed consent has been obtained with: patient and child.

## 2020-10-13 ENCOUNTER — LAB (OUTPATIENT)
Dept: LAB | Facility: HOSPITAL | Age: 60
End: 2020-10-13

## 2020-10-13 DIAGNOSIS — Z01.818 PREOP TESTING: Primary | ICD-10-CM

## 2020-10-13 PROCEDURE — U0003 INFECTIOUS AGENT DETECTION BY NUCLEIC ACID (DNA OR RNA); SEVERE ACUTE RESPIRATORY SYNDROME CORONAVIRUS 2 (SARS-COV-2) (CORONAVIRUS DISEASE [COVID-19]), AMPLIFIED PROBE TECHNIQUE, MAKING USE OF HIGH THROUGHPUT TECHNOLOGIES AS DESCRIBED BY CMS-2020-01-R: HCPCS

## 2020-10-13 PROCEDURE — C9803 HOPD COVID-19 SPEC COLLECT: HCPCS

## 2020-10-14 LAB
COVID LABCORP PRIORITY: NORMAL
SARS-COV-2 RNA RESP QL NAA+PROBE: NOT DETECTED

## 2020-10-16 ENCOUNTER — ANESTHESIA EVENT (OUTPATIENT)
Dept: PERIOP | Facility: HOSPITAL | Age: 60
End: 2020-10-16

## 2020-10-16 ENCOUNTER — HOSPITAL ENCOUNTER (OUTPATIENT)
Facility: HOSPITAL | Age: 60
Setting detail: HOSPITAL OUTPATIENT SURGERY
Discharge: HOME | End: 2020-10-16
Attending: OPHTHALMOLOGY | Admitting: OPHTHALMOLOGY

## 2020-10-16 ENCOUNTER — ANESTHESIA (OUTPATIENT)
Dept: PERIOP | Facility: HOSPITAL | Age: 60
End: 2020-10-16

## 2020-10-16 VITALS
DIASTOLIC BLOOD PRESSURE: 61 MMHG | TEMPERATURE: 96.9 F | SYSTOLIC BLOOD PRESSURE: 116 MMHG | HEIGHT: 61 IN | HEART RATE: 67 BPM | OXYGEN SATURATION: 98 % | BODY MASS INDEX: 28.55 KG/M2 | RESPIRATION RATE: 18 BRPM | WEIGHT: 151.24 LBS

## 2020-10-16 PROCEDURE — 25010000002 VANCOMYCIN 1 G RECONSTITUTED SOLUTION 1 EACH VIAL: Performed by: OPHTHALMOLOGY

## 2020-10-16 PROCEDURE — 25010000002 EPINEPHRINE PER 0.1 MG: Performed by: OPHTHALMOLOGY

## 2020-10-16 PROCEDURE — 25010000002 MIDAZOLAM PER 1 MG: Performed by: NURSE ANESTHETIST, CERTIFIED REGISTERED

## 2020-10-16 PROCEDURE — V2632 POST CHMBR INTRAOCULAR LENS: HCPCS | Performed by: OPHTHALMOLOGY

## 2020-10-16 DEVICE — LENS ACRYSOF IQ 6X13MM SN60WF 23.5: Type: IMPLANTABLE DEVICE | Site: EYE | Status: FUNCTIONAL

## 2020-10-16 RX ORDER — PHENYLEPHRINE HCL 2.5 %
1 DROPS OPHTHALMIC (EYE)
Status: COMPLETED | OUTPATIENT
Start: 2020-10-16 | End: 2020-10-16

## 2020-10-16 RX ORDER — MIDAZOLAM HYDROCHLORIDE 1 MG/ML
INJECTION INTRAMUSCULAR; INTRAVENOUS AS NEEDED
Status: DISCONTINUED | OUTPATIENT
Start: 2020-10-16 | End: 2020-10-16 | Stop reason: SURG

## 2020-10-16 RX ORDER — TETRACAINE HYDROCHLORIDE 5 MG/ML
1 SOLUTION OPHTHALMIC
Status: COMPLETED | OUTPATIENT
Start: 2020-10-16 | End: 2020-10-16

## 2020-10-16 RX ORDER — SODIUM CHLORIDE 0.9 % (FLUSH) 0.9 %
10 SYRINGE (ML) INJECTION AS NEEDED
Status: DISCONTINUED | OUTPATIENT
Start: 2020-10-16 | End: 2020-10-16 | Stop reason: HOSPADM

## 2020-10-16 RX ORDER — CYCLOPENTOLATE HYDROCHLORIDE 20 MG/ML
1 SOLUTION/ DROPS OPHTHALMIC
Status: COMPLETED | OUTPATIENT
Start: 2020-10-16 | End: 2020-10-16

## 2020-10-16 RX ORDER — MEPERIDINE HYDROCHLORIDE 25 MG/ML
12.5 INJECTION INTRAMUSCULAR; INTRAVENOUS; SUBCUTANEOUS
Status: DISCONTINUED | OUTPATIENT
Start: 2020-10-16 | End: 2020-10-16 | Stop reason: HOSPADM

## 2020-10-16 RX ORDER — PROMETHAZINE HYDROCHLORIDE 25 MG/1
25 TABLET ORAL ONCE AS NEEDED
Status: DISCONTINUED | OUTPATIENT
Start: 2020-10-16 | End: 2020-10-16 | Stop reason: HOSPADM

## 2020-10-16 RX ORDER — PROMETHAZINE HYDROCHLORIDE 25 MG/1
25 SUPPOSITORY RECTAL ONCE AS NEEDED
Status: DISCONTINUED | OUTPATIENT
Start: 2020-10-16 | End: 2020-10-16 | Stop reason: HOSPADM

## 2020-10-16 RX ORDER — BRIMONIDINE TARTRATE 0.15 %
DROPS OPHTHALMIC (EYE) AS NEEDED
Status: DISCONTINUED | OUTPATIENT
Start: 2020-10-16 | End: 2020-10-16 | Stop reason: HOSPADM

## 2020-10-16 RX ORDER — PREDNISOLONE ACETATE 10 MG/ML
SUSPENSION/ DROPS OPHTHALMIC AS NEEDED
Status: DISCONTINUED | OUTPATIENT
Start: 2020-10-16 | End: 2020-10-16 | Stop reason: HOSPADM

## 2020-10-16 RX ORDER — MOXIFLOXACIN 5 MG/ML
SOLUTION/ DROPS OPHTHALMIC AS NEEDED
Status: DISCONTINUED | OUTPATIENT
Start: 2020-10-16 | End: 2020-10-16 | Stop reason: HOSPADM

## 2020-10-16 RX ORDER — TETRACAINE HYDROCHLORIDE 5 MG/ML
SOLUTION OPHTHALMIC AS NEEDED
Status: DISCONTINUED | OUTPATIENT
Start: 2020-10-16 | End: 2020-10-16 | Stop reason: HOSPADM

## 2020-10-16 RX ORDER — ONDANSETRON 2 MG/ML
4 INJECTION INTRAMUSCULAR; INTRAVENOUS ONCE AS NEEDED
Status: DISCONTINUED | OUTPATIENT
Start: 2020-10-16 | End: 2020-10-16 | Stop reason: HOSPADM

## 2020-10-16 RX ADMIN — TETRACAINE HYDROCHLORIDE 1 DROP: 5 SOLUTION OPHTHALMIC at 08:21

## 2020-10-16 RX ADMIN — TETRACAINE HYDROCHLORIDE 1 DROP: 5 SOLUTION OPHTHALMIC at 08:02

## 2020-10-16 RX ADMIN — MIDAZOLAM HYDROCHLORIDE 1 MG: 2 INJECTION, SOLUTION INTRAMUSCULAR; INTRAVENOUS at 09:28

## 2020-10-16 RX ADMIN — PHENYLEPHRINE HYDROCHLORIDE 1 DROP: 25 SOLUTION/ DROPS OPHTHALMIC at 08:21

## 2020-10-16 RX ADMIN — MIDAZOLAM HYDROCHLORIDE 1 MG: 2 INJECTION, SOLUTION INTRAMUSCULAR; INTRAVENOUS at 09:36

## 2020-10-16 RX ADMIN — CYCLOPENTOLATE HYDROCHLORIDE 1 DROP: 20 SOLUTION/ DROPS OPHTHALMIC at 08:02

## 2020-10-16 RX ADMIN — SODIUM CHLORIDE, PRESERVATIVE FREE 5 ML: 5 INJECTION INTRAVENOUS at 09:29

## 2020-10-16 RX ADMIN — CYCLOPENTOLATE HYDROCHLORIDE 1 DROP: 20 SOLUTION/ DROPS OPHTHALMIC at 08:21

## 2020-10-16 RX ADMIN — PHENYLEPHRINE HYDROCHLORIDE 1 DROP: 25 SOLUTION/ DROPS OPHTHALMIC at 08:02

## 2020-10-16 RX ADMIN — PHENYLEPHRINE HYDROCHLORIDE 1 DROP: 25 SOLUTION/ DROPS OPHTHALMIC at 08:12

## 2020-10-16 RX ADMIN — SODIUM CHLORIDE, PRESERVATIVE FREE 10 ML: 5 INJECTION INTRAVENOUS at 08:10

## 2020-10-16 RX ADMIN — CYCLOPENTOLATE HYDROCHLORIDE 1 DROP: 20 SOLUTION/ DROPS OPHTHALMIC at 08:12

## 2020-10-16 NOTE — ANESTHESIA POSTPROCEDURE EVALUATION
Patient: Sondra Felder    Procedure Summary     Date: 10/16/20 Room / Location: [facility] OR 06 / [facility] OR    Anesthesia Start: 0930 Anesthesia Stop: 0942    Procedure: REMOVE CATARACT AND IMPLANT  INTRAOCULAR LENS                 (latex allergy) (Left Eye) Diagnosis:       Age-related nuclear cataract of left eye      (age-related nuclear cataract of left eye)    Surgeon: Agustin Ferguson MD Provider: Puneet Giraldo CRNA    Anesthesia Type: MAC ASA Status: 3          Anesthesia Type: MAC    Vitals  No vitals data found for the desired time range.          Post Anesthesia Care and Evaluation    Patient location during evaluation: bedside  Patient participation: complete - patient participated  Level of consciousness: awake and alert  Pain score: 0  Pain management: adequate  Airway patency: patent  Anesthetic complications: No anesthetic complications  PONV Status: none  Cardiovascular status: acceptable  Respiratory status: acceptable  Hydration status: acceptable

## 2020-10-16 NOTE — ANESTHESIA PREPROCEDURE EVALUATION
Anesthesia Evaluation     Patient summary reviewed and Nursing notes reviewed   history of anesthetic complications: prolonged sedation  NPO Solid Status: > 8 hours  NPO Liquid Status: > 8 hours           Airway   Mallampati: II  TM distance: >3 FB  Neck ROM: full  Possible difficult intubation  Dental    (+) edentulous    Pulmonary - normal exam    breath sounds clear to auscultation  (-) asthma, sleep apnea, rhonchi, decreased breath sounds, wheezes, not a smoker  Cardiovascular   Exercise tolerance: poor (<4 METS)    Rhythm: regular  Rate: normal    (+) dysrhythmias Bradycardia,   (-) pacemaker, hypertension, valvular problems/murmurs, past MI, CAD, angina, murmur, cardiac stents, CABG, DVT, hyperlipidemia      Neuro/Psych  (+) TIA (2015), numbness (sciatica), psychiatric history Anxiety and Depression,     (-) seizures  GI/Hepatic/Renal/Endo    (+)  hiatal hernia, GERD well controlled,    (-)  obesity, morbid obesity, hepatitis, liver disease, no renal disease, diabetes, no thyroid disorder    Musculoskeletal     (+) arthralgias, myalgias, radiculopathy Right lower extremity  Abdominal  - normal exam  (-) obese   Substance History   (-) alcohol use, drug use     OB/GYN negative ob/gyn ROS         Other   arthritis,      (-) history of cancer  ROS/Med Hx Other: Hx of TIA x2- no sequelae- last one was 2015                    Anesthesia Plan    ASA 3     MAC   (Discussed MAC anesthesia with patient & daughter understands possible complications, risks, & agrees.  2nd eye)  intravenous induction     Anesthetic plan, all risks, benefits, and alternatives have been provided, discussed and informed consent has been obtained with: patient and child.

## 2021-02-05 ENCOUNTER — APPOINTMENT (OUTPATIENT)
Dept: MRI IMAGING | Facility: HOSPITAL | Age: 61
End: 2021-02-05

## 2021-02-15 ENCOUNTER — APPOINTMENT (OUTPATIENT)
Dept: MRI IMAGING | Facility: HOSPITAL | Age: 61
End: 2021-02-15

## 2021-02-24 ENCOUNTER — HOSPITAL ENCOUNTER (OUTPATIENT)
Dept: MRI IMAGING | Facility: HOSPITAL | Age: 61
Discharge: HOME | End: 2021-02-24
Admitting: ANESTHESIOLOGY

## 2021-02-24 DIAGNOSIS — M54.50 LOW BACK PAIN, UNSPECIFIED BACK PAIN LATERALITY, UNSPECIFIED CHRONICITY, UNSPECIFIED WHETHER SCIATICA PRESENT: ICD-10-CM

## 2021-02-24 PROCEDURE — 72148 MRI LUMBAR SPINE W/O DYE: CPT

## 2021-04-01 ENCOUNTER — HOSPITAL ENCOUNTER (OUTPATIENT)
Facility: HOSPITAL | Age: 61
Setting detail: HOSPITAL OUTPATIENT SURGERY
End: 2021-04-01
Attending: INTERNAL MEDICINE | Admitting: INTERNAL MEDICINE

## 2021-04-01 ENCOUNTER — OFFICE VISIT (OUTPATIENT)
Dept: GASTROENTEROLOGY | Facility: CLINIC | Age: 61
End: 2021-04-01

## 2021-04-01 VITALS
OXYGEN SATURATION: 100 % | HEART RATE: 69 BPM | WEIGHT: 149.2 LBS | BODY MASS INDEX: 28.17 KG/M2 | HEIGHT: 61 IN | SYSTOLIC BLOOD PRESSURE: 115 MMHG | DIASTOLIC BLOOD PRESSURE: 80 MMHG

## 2021-04-01 DIAGNOSIS — Z12.11 ENCOUNTER FOR SCREENING FOR MALIGNANT NEOPLASM OF COLON: ICD-10-CM

## 2021-04-01 DIAGNOSIS — K21.9 GASTROESOPHAGEAL REFLUX DISEASE, UNSPECIFIED WHETHER ESOPHAGITIS PRESENT: Primary | ICD-10-CM

## 2021-04-01 PROCEDURE — 99204 OFFICE O/P NEW MOD 45 MIN: CPT | Performed by: INTERNAL MEDICINE

## 2021-04-01 RX ORDER — DEXTROSE AND SODIUM CHLORIDE 5; .45 G/100ML; G/100ML
30 INJECTION, SOLUTION INTRAVENOUS CONTINUOUS PRN
Status: CANCELLED | OUTPATIENT
Start: 2021-04-01

## 2021-04-01 RX ORDER — PHENOL 1.4 %
600 AEROSOL, SPRAY (ML) MUCOUS MEMBRANE DAILY
COMMUNITY

## 2021-04-01 RX ORDER — ALENDRONATE SODIUM 70 MG/1
TABLET ORAL
COMMUNITY
Start: 2021-02-24

## 2021-04-01 NOTE — PROGRESS NOTES
[facility]      Chief Complaint:   Chief Complaint   Patient presents with   • Difficulty Swallowing     chief complaint   • Colonoscopy Consultation       Subjective     HPI:   Patient with severe reflux.  Patient states that at night she wakes up choking on refluxed food and liquid.  Patient denies any major changes in bowel habits but has had some episodes of diarrhea.  Patient not had a colonoscopy in greater than 10 years.    Plan; we will schedule patient for EGD and colonoscopy to evaluate outpatient follow-up in the office following these procedures.    Past Medical History:   Past Medical History:   Diagnosis Date   • Adenomatous polyp of colon    • Anxiety    • Carpal tunnel syndrome    • Depression    • Diabetes (CMS/HCC)    • Exanthematous disorder    • GERD (gastroesophageal reflux disease)    • Hiatal hernia    • IBS (irritable bowel syndrome)    • Osteoporosis    • Umbilical hernia    • URI (upper respiratory infection)        Past Surgical History:  Past Surgical History:   Procedure Laterality Date   • CATARACT EXTRACTION W/ INTRAOCULAR LENS IMPLANT Right 10/9/2020    Procedure: CATARACT PHACO EXTRACTION WITH INTRAOCULAR LENS IMPLANT            (LATEX ALLERGY);  Surgeon: Agustin Ferguson MD;  Location: [facility];  Service: Ophthalmology;  Laterality: Right;   • CATARACT EXTRACTION W/ INTRAOCULAR LENS IMPLANT Left 10/16/2020    Procedure: REMOVE CATARACT AND IMPLANT  INTRAOCULAR LENS                 (latex allergy);  Surgeon: Agustin Ferguson MD;  Location: [facility];  Service: Ophthalmology;  Laterality: Left;   •  SECTION     • ENDOSCOPY  10/25/2002    Hiatal hernia, Veronica's esophagus.   • ENDOSCOPY AND COLONOSCOPY      Hemorrhoids   • INJECTION OF MEDICATION  2010    Kenalog (3)     • INJECTION OF MEDICATION  2009    Rocephin (1)      • SHOULDER SURGERY  2004    Impingement syndrome of the left shoulder. arthroscopic subacromial  decompression, left shoulder., dr Duff   • TUBAL ABDOMINAL LIGATION         Family History:  Family History   Problem Relation Age of Onset   • Colon cancer Mother    • Breast cancer Sister    • Ovarian cancer Sister    • Ovarian cancer Daughter        Social History:   reports that she has never smoked. She has never used smokeless tobacco. She reports that she does not drink alcohol and does not use drugs.    Medications:   Prior to Admission medications    Medication Sig Start Date End Date Taking? Authorizing Provider   calcium carbonate (OS-AKSHAT) 600 MG tablet Take 600 mg by mouth Daily.   Yes Lorenzo Edgar MD   alendronate (FOSAMAX) 70 MG tablet TAKE ONE TABLET BY MOUTH ONCE A WEEK 30 minutes BEFORE THE first food, beverage OR medicine of THE DAY with plain water 2/24/21   Lorenzo Edgar MD   esomeprazole (nexIUM) 20 MG capsule Daily.    Lorenzo Edgar MD   gabapentin (NEURONTIN) 100 MG capsule Take 100 mg by mouth 3 (Three) Times a Day.    Lorenzo Edgar MD   HYDROcodone-acetaminophen (NORCO)  MG per tablet Take 1 tablet by mouth.    Lorenzo Edgar MD   QUEtiapine Fumarate (SEROQUEL PO) Take  by mouth Every Night.    Lorenzo Edgra MD       Allergies:  Codeine, Latex, and Flexeril [cyclobenzaprine]    ROS:    Review of Systems   Constitutional: Negative for activity change, appetite change, chills, diaphoresis, fatigue, fever and unexpected weight change.   HENT: Negative for sore throat and trouble swallowing.    Respiratory: Negative for shortness of breath.    Gastrointestinal: Negative for abdominal distention, abdominal pain, anal bleeding, blood in stool, constipation, diarrhea, nausea, rectal pain and vomiting.   Endocrine: Negative for polydipsia, polyphagia and polyuria.   Genitourinary: Negative for difficulty urinating.   Musculoskeletal: Negative for arthralgias.   Skin: Negative for pallor.   Allergic/Immunologic: Negative for food allergies.  "  Neurological: Negative for weakness and light-headedness.   Psychiatric/Behavioral: Negative for behavioral problems.     Objective     Blood pressure 115/80, pulse 69, height 154.9 cm (61\"), weight 67.7 kg (149 lb 3.2 oz), SpO2 100 %.    Physical Exam  Constitutional:       General: She is not in acute distress.     Appearance: She is well-developed. She is not diaphoretic.   HENT:      Head: Normocephalic and atraumatic.   Cardiovascular:      Rate and Rhythm: Normal rate and regular rhythm.      Heart sounds: Normal heart sounds. No murmur heard.   No friction rub. No gallop.    Pulmonary:      Effort: No respiratory distress.      Breath sounds: Normal breath sounds. No wheezing or rales.   Chest:      Chest wall: No tenderness.   Abdominal:      General: Bowel sounds are normal. There is no distension.      Palpations: Abdomen is soft. There is no mass.      Tenderness: There is no abdominal tenderness. There is no guarding or rebound.      Hernia: No hernia is present.   Musculoskeletal:         General: Normal range of motion.   Skin:     General: Skin is warm and dry.      Coloration: Skin is not pale.      Findings: No erythema or rash.   Neurological:      Mental Status: She is alert and oriented to person, place, and time.   Psychiatric:         Behavior: Behavior normal.         Thought Content: Thought content normal.         Judgment: Judgment normal.          Assessment/Plan   Diagnoses and all orders for this visit:    1. Gastroesophageal reflux disease, unspecified whether esophagitis present (Primary)  -     Case Request; Standing  -     dextrose 5 % and sodium chloride 0.45 % infusion  -     Case Request    2. Encounter for screening for malignant neoplasm of colon  -     Case Request; Standing  -     dextrose 5 % and sodium chloride 0.45 % infusion  -     Case Request    Other orders  -     Follow Anesthesia Guidelines / Standing Orders; Future  -     Obtain Informed Consent; Future  -     " Implement Anesthesia Orders Day of Procedure; Standing  -     Obtain Informed Consent; Standing  -     POC Glucose Once; Standing  -     Insert Peripheral IV; Standing        ESOPHAGOGASTRODUODENOSCOPY (N/A), COLONOSCOPY (N/A)     Diagnosis Plan   1. Gastroesophageal reflux disease, unspecified whether esophagitis present  Case Request    dextrose 5 % and sodium chloride 0.45 % infusion    Case Request   2. Encounter for screening for malignant neoplasm of colon  Case Request    dextrose 5 % and sodium chloride 0.45 % infusion    Case Request       Anticipated Surgical Procedure:  Orders Placed This Encounter   Procedures   • Follow Anesthesia Guidelines / Standing Orders     Standing Status:   Future   • Obtain Informed Consent     Standing Status:   Future     Order Specific Question:   Informed Consent Given For     Answer:   egd and colonoscopy       The risks, benefits, and alternatives of this procedure have been discussed with the patient or the responsible party- the patient understands and agrees to proceed.

## 2021-04-01 NOTE — PATIENT INSTRUCTIONS
Dysphagia    Dysphagia is trouble swallowing. This condition occurs when solids and liquids stick in a person's throat on the way down to the stomach, or when food takes longer to get to the stomach than usual.  You may have problems swallowing food, liquids, or both. You may also have pain while trying to swallow. It may take you more time and effort to swallow something.  What are the causes?  This condition may be caused by:  · Muscle problems. They may make it difficult for you to move food and liquids through the esophagus, which is the tube that connects your mouth to your stomach.  · Blockages. You may have ulcers, scar tissue, or inflammation that blocks the normal passage of food and liquids. Causes of these problems include:  ? Acid reflux from your stomach into your esophagus (gastroesophageal reflux).  ? Infections.  ? Radiation treatment for cancer.  ? Medicines taken without enough fluids to wash them down into your stomach.  · Stroke. This can affect the nerves and make it difficult to swallow.  · Nerve problems. These prevent signals from being sent to the muscles of your esophagus to squeeze (contract) and move what you swallow down to your stomach.  · Globus pharyngeus. This is a common problem that involves a feeling like something is stuck in your throat or a sense of trouble with swallowing, even though nothing is wrong with the swallowing passages.  · Certain conditions, such as cerebral palsy or Parkinson's disease.  What are the signs or symptoms?  Common symptoms of this condition include:  · A feeling that solids or liquids are stuck in your throat on the way down to the stomach.  · Pain while swallowing.  · Coughing or gagging while trying to swallow.  Other symptoms include:  · Food moving back from your stomach to your mouth (regurgitation).  · Noises coming from your throat.  · Chest discomfort with swallowing.  · A feeling of fullness when swallowing.  · Drooling, especially when the  throat is blocked.  · Heartburn.  How is this diagnosed?  This condition may be diagnosed by:  · Barium X-ray. In this test, you will swallow a white liquid that sticks to the inside of your esophagus. X-ray images are then taken.  · Endoscopy. In this test, a flexible telescope is inserted down your throat to look at your esophagus and your stomach.  · CT scans and an MRI.  How is this treated?  Treatment for dysphagia depends on the cause of this condition, such as:  · If the dysphagia is caused by acid reflux or infection, medicines may be used. They may include antibiotics and heartburn medicines.  · If the dysphagia is caused by problems with the muscles, swallowing therapy may be used to help you strengthen your swallowing muscles. You may have to do specific exercises to strengthen the muscles or stretch them.  · If the dysphagia is caused by a blockage or mass, procedures to remove the blockage may be done. You may need surgery and a feeding tube.  You may need to make diet changes. Ask your health care provider for specific instructions.  Follow these instructions at home:  Medicines  · Take over-the-counter and prescription medicines only as told by your health care provider.  · If you were prescribed an antibiotic medicine, take it as told by your health care provider. Do not stop taking the antibiotic even if you start to feel better.  Eating and drinking    · Follow any diet changes as told by your health care provider.  · Work with a diet and nutrition specialist (dietitian) to create an eating plan that will help you get the nutrients you need in order to stay healthy.  · Eat soft foods that are easier to swallow.  · Cut your food into small pieces and eat slowly. Take small bites.  · Eat and drink only when you are sitting upright.  · Do not drink alcohol or caffeine. If you need help quitting, ask your health care provider.  General instructions  · Check your weight every day to make sure you are  not losing weight.  · Do not use any products that contain nicotine or tobacco, such as cigarettes, e-cigarettes, and chewing tobacco. If you need help quitting, ask your health care provider.  · Keep all follow-up visits as told by your health care provider. This is important.  Contact a health care provider if you:  · Lose weight because you cannot swallow.  · Cough when you drink liquids.  · Cough up partially digested food.  Get help right away if you:  · Cannot swallow your saliva.  · Have shortness of breath, a fever, or both.  · Have a hoarse voice and also have trouble swallowing.  Summary  · Dysphagia is trouble swallowing. This condition occurs when solids and liquids stick in a person's throat on the way down to the stomach. You may cough or gag while trying to swallow.  · Dysphagia has many possible causes.  · Treatment for dysphagia depends on the cause of the condition.  · Keep all follow-up visits as told by your health care provider. This is important.  This information is not intended to replace advice given to you by your health care provider. Make sure you discuss any questions you have with your health care provider.  Document Revised: 05/13/2020 Document Reviewed: 05/13/2020  [employer] Patient Education © 2021 [employer]

## 2022-08-03 ENCOUNTER — OFFICE VISIT (OUTPATIENT)
Dept: GASTROENTEROLOGY | Facility: CLINIC | Age: 62
End: 2022-08-03

## 2022-08-03 VITALS
HEART RATE: 65 BPM | WEIGHT: 136.4 LBS | SYSTOLIC BLOOD PRESSURE: 137 MMHG | HEIGHT: 61 IN | DIASTOLIC BLOOD PRESSURE: 61 MMHG | BODY MASS INDEX: 25.75 KG/M2

## 2022-08-03 DIAGNOSIS — K21.00 GASTROESOPHAGEAL REFLUX DISEASE WITH ESOPHAGITIS WITHOUT HEMORRHAGE: Primary | ICD-10-CM

## 2022-08-03 DIAGNOSIS — R19.7 DIARRHEA, UNSPECIFIED TYPE: ICD-10-CM

## 2022-08-03 DIAGNOSIS — R10.30 LOWER ABDOMINAL PAIN: ICD-10-CM

## 2022-08-03 DIAGNOSIS — Z80.0 FHX: COLON CANCER: ICD-10-CM

## 2022-08-03 DIAGNOSIS — R19.4 CHANGE IN BOWEL HABITS: ICD-10-CM

## 2022-08-03 PROBLEM — Z86.79 HISTORY OF HYPERTENSIVE CRISIS: Status: ACTIVE | Noted: 2020-05-07

## 2022-08-03 PROBLEM — I10 BENIGN ESSENTIAL HYPERTENSION: Status: ACTIVE | Noted: 2020-04-22

## 2022-08-03 PROBLEM — G43.009 MIGRAINE WITHOUT AURA AND WITHOUT STATUS MIGRAINOSUS, NOT INTRACTABLE: Status: ACTIVE | Noted: 2020-08-04

## 2022-08-03 PROBLEM — M54.16 LUMBAR RADICULOPATHY, RIGHT: Status: ACTIVE | Noted: 2017-08-15

## 2022-08-03 PROCEDURE — 99214 OFFICE O/P EST MOD 30 MIN: CPT | Performed by: NURSE PRACTITIONER

## 2022-08-03 RX ORDER — PEG-3350, SODIUM SULFATE, SODIUM CHLORIDE, POTASSIUM CHLORIDE, SODIUM ASCORBATE AND ASCORBIC ACID 7.5-2.691G
1000 KIT ORAL EVERY 12 HOURS
Qty: 2000 ML | Refills: 0 | Status: SHIPPED | OUTPATIENT
Start: 2022-08-03 | End: 2022-09-27

## 2022-08-03 RX ORDER — DEXTROSE AND SODIUM CHLORIDE 5; .45 G/100ML; G/100ML
30 INJECTION, SOLUTION INTRAVENOUS CONTINUOUS PRN
Status: CANCELLED | OUTPATIENT
Start: 2022-09-19

## 2022-08-03 NOTE — PROGRESS NOTES
Chief Complaint   Patient presents with   • Colon Cancer Screening       Subjective    Sondra Felder is a 62 y.o. female. she is here today for follow-up.    62-year-old female presents to discuss change in bowel habits diarrhea and chronic GERD.  She was seen by Dr. Cruz April 1, 2021.  Reports she was scheduled for EGD and colonoscopy at that time but was nervous to undergo procedures and do cleanouts have canceled.  Was having dysphagia at that time and reports that has resolved but she is still having frequent episodes of acid indigestion Nexium helps somewhat but does not fully control symptoms reports intermittent nausea states over the last 6 or 7 months she began to have a change in bowel habits described as watery stool typically 5 or 6 times per day but up to 10 on bad days.  Denies melena or hematochezia.  Reports nocturnal stooling at times.    Diarrhea   This is a chronic problem. The current episode started more than 1 month ago (about 6-7 months ago ). The problem occurs 5 to 10 times per day. The stool consistency is described as watery. The patient states that diarrhea awakens her from sleep. Associated symptoms include abdominal pain and bloating. Pertinent negatives include no arthralgias, chills, coughing, fever, headaches, increased  flatus, myalgias or vomiting.     Plan; EGD due to chronic GERD abdominal pain diarrhea will obtain biopsy rule out gluten intolerance colonoscopy due to change in bowel habits increased amounts of diarrhea will obtain biopsy to rule out microscopic colitis or inflammatory bowel disease.       The following portions of the patient's history were reviewed and updated as appropriate:   Past Medical History:   Diagnosis Date   • Adenomatous polyp of colon    • Anxiety    • Carpal tunnel syndrome    • Depression    • Diabetes (HCC)    • Exanthematous disorder    • GERD (gastroesophageal reflux disease)    • Hiatal hernia    • IBS (irritable bowel syndrome)    •  Osteoporosis    • Umbilical hernia    • URI (upper respiratory infection)      Past Surgical History:   Procedure Laterality Date   • CATARACT EXTRACTION W/ INTRAOCULAR LENS IMPLANT Right 10/9/2020    Procedure: CATARACT PHACO EXTRACTION WITH INTRAOCULAR LENS IMPLANT            (LATEX ALLERGY);  Surgeon: Agustin Ferguson MD;  Location: [facility];  Service: Ophthalmology;  Laterality: Right;   • CATARACT EXTRACTION W/ INTRAOCULAR LENS IMPLANT Left 10/16/2020    Procedure: REMOVE CATARACT AND IMPLANT  INTRAOCULAR LENS                 (latex allergy);  Surgeon: Agustin Ferguson MD;  Location: [facility];  Service: Ophthalmology;  Laterality: Left;   •  SECTION     • ENDOSCOPY  10/25/2002    Hiatal hernia, Veronica's esophagus.   • ENDOSCOPY AND COLONOSCOPY      Hemorrhoids   • INJECTION OF MEDICATION  2010    Kenalog (3)     • INJECTION OF MEDICATION  2009    Rocephin (1)      • SHOULDER SURGERY  2004    Impingement syndrome of the left shoulder. arthroscopic subacromial decompression, left shoulder., dr Duff   • TUBAL ABDOMINAL LIGATION       Family History   Problem Relation Age of Onset   • Colon cancer Mother    • Breast cancer Sister    • Ovarian cancer Sister    • Ovarian cancer Daughter      OB History        2    Para   2    Term   2            AB        Living           SAB        IAB        Ectopic        Molar        Multiple        Live Births                  Prior to Admission medications    Medication Sig Start Date End Date Taking? Authorizing Provider   alendronate (FOSAMAX) 70 MG tablet TAKE ONE TABLET BY MOUTH ONCE A WEEK 30 minutes BEFORE THE first food, beverage OR medicine of THE DAY with plain water 21   ProviderLorenzo MD   calcium carbonate (OS-AKSHAT) 600 MG tablet Take 600 mg by mouth Daily.    Lorenzo Edgar MD   esomeprazole (nexIUM) 20 MG capsule Daily.    Lorenzo Edgar MD   gabapentin (NEURONTIN) 100 MG capsule  "Take 100 mg by mouth 3 (Three) Times a Day.    Provider, Historical, MD   HYDROcodone-acetaminophen (NORCO)  MG per tablet Take 1 tablet by mouth.    Provider, MD Lorenzo   QUEtiapine Fumarate (SEROQUEL PO) Take  by mouth Every Night.    Provider, Historical, MD     Allergies   Allergen Reactions   • Codeine Anaphylaxis and Palpitations   • Latex Itching and Anaphylaxis     Causes skin peeling   • Flexeril [Cyclobenzaprine] Other (See Comments)     CAUSES BRADYCARDIA     Social History     Socioeconomic History   • Marital status:    Tobacco Use   • Smoking status: Never Smoker   • Smokeless tobacco: Never Used   Substance and Sexual Activity   • Alcohol use: No   • Drug use: No   • Sexual activity: Not Currently     Partners: Male       Review of Systems  Review of Systems   Constitutional: Positive for appetite change (early satiety, decreased appetite ), fatigue and unexpected weight change (down 13 pounds ). Negative for activity change, chills, diaphoresis and fever.   Respiratory: Negative for cough.    Gastrointestinal: Positive for abdominal pain, bloating, diarrhea and nausea. Negative for abdominal distention, anal bleeding, blood in stool, constipation, flatus, rectal pain and vomiting.   Musculoskeletal: Negative for arthralgias and myalgias.   Neurological: Negative for headaches.        /61 (BP Location: Left arm)   Pulse 65   Ht 154.9 cm (61\")   Wt 61.9 kg (136 lb 6.4 oz)   BMI 25.77 kg/m²     Objective    Physical Exam  Constitutional:       General: She is not in acute distress.     Appearance: Normal appearance. She is normal weight. She is not ill-appearing.   HENT:      Head: Normocephalic and atraumatic.   Pulmonary:      Effort: Pulmonary effort is normal.   Abdominal:      General: Abdomen is flat. Bowel sounds are normal. There is no distension.      Palpations: Abdomen is soft. There is no mass.      Tenderness: There is abdominal tenderness in the right lower " quadrant, epigastric area and left lower quadrant.   Neurological:      Mental Status: She is alert.       Lab on 10/13/2020   Component Date Value Ref Range Status   • SARS-CoV-2, TANNA 10/13/2020 Not Detected  Not Detected Final    Comment: This nucleic acid amplification test was developed and its performance  characteristics determined by [facility]. Nucleic acid  amplification tests include PCR and TMA. This test has not been FDA  cleared or approved. This test has been authorized by FDA under an  Emergency Use Authorization (EUA). This test is only authorized for  the duration of time the declaration that circumstances exist  justifying the authorization of the emergency use of in vitro  diagnostic tests for detection of SARS-CoV-2 virus and/or diagnosis  of COVID-19 infection under section 564(b)(1) of the Act, 21 U.S.C.  360bbb-3(b) (1), unless the authorization is terminated or revoked  sooner.  When diagnostic testing is negative, the possibility of a false  negative result should be considered in the context of a patient's  recent exposures and the presence of clinical signs and symptoms  consistent with COVID-19. An individual without symptoms of COVID-19  and who is not shedding SARS-CoV-2 virus would                            expect to have a  negative (not detected) result in this assay.   • COVID [facility] PRIORITY 10/13/2020 Comment   Final    Received     Assessment & Plan      1. Gastroesophageal reflux disease with esophagitis without hemorrhage    2. Lower abdominal pain    3. Change in bowel habits    4. Diarrhea, unspecified type    5. FHx: colon cancer    .       Orders placed during this encounter include:  Orders Placed This Encounter   Procedures   • Follow Anesthesia Guidelines / Standing Orders     Standing Status:   Future   • Obtain Informed Consent     Standing Status:   Future     Order Specific Question:   Informed Consent Given For     Answer:   ESOPHAGOGASTRODUODENOSCOPY  and Colonoscopy       ESOPHAGOGASTRODUODENOSCOPY (N/A), COLONOSCOPY (N/A)    Review and/or summary of lab tests, radiology, procedures, medications. Review and summary of old records and obtaining of history. The risks and benefits of my recommendations, as well as other treatment options were discussed with the patient today. Questions were answered.    New Medications Ordered This Visit   Medications   • PEG-KCl-NaCl-NaSulf-Na Asc-C (MoviPrep) 100 g reconstituted solution powder     Sig: Take 1,000 mL by mouth Every 12 (Twelve) Hours.     Dispense:  2000 mL     Refill:  0       Follow-up: No follow-ups on file.          This document has been electronically signed by SUSAN Weaver on August 3, 2022 13:10 CDT           I spent 20 minutes caring for Sondra on this date of service. This time includes time spent by me in the following activities:preparing for the visit, reviewing tests, obtaining and/or reviewing a separately obtained history, performing a medically appropriate examination and/or evaluation , counseling and educating the patient/family/caregiver, ordering medications, tests, or procedures, referring and communicating with other health care professionals , documenting information in the medical record and care coordination    Results for orders placed or performed in visit on 10/13/20   COVID [facility] Priority - Swab, Nasopharynx    Specimen: Nasopharynx; Swab   Result Value Ref Range    COVID [facility] PRIORITY Comment    COVID-19,[facility] ROUTINE, NP/OP SWAB IN TRANSPORT MEDIA OR ESWAB 72 HR TAT - Swab, Nasopharynx    Specimen: Nasopharynx; Swab   Result Value Ref Range    SARS-CoV-2, TANNA Not Detected Not Detected   Results for orders placed or performed during the hospital encounter of 10/09/20   POC Glucose Once    Specimen: Blood   Result Value Ref Range    Glucose 80 70 - 130 mg/dL   Results for orders placed or performed in visit on 10/06/20   COVID [facility] Priority - Swab, Nasopharynx     Specimen: Nasopharynx; Swab   Result Value Ref Range    COVID [facility] PRIORITY Comment    COVID-19,[facility] ROUTINE, NP/OP SWAB IN TRANSPORT MEDIA OR ESWAB 72 HR TAT - Swab, Nasopharynx    Specimen: Nasopharynx; Swab   Result Value Ref Range    SARS-CoV-2, TANNA Not Detected Not Detected   Results for orders placed or performed during the hospital encounter of 11/20/19   Gold Top - SST   Result Value Ref Range    Extra Tube Hold for add-ons.    Green Top (Gel)   Result Value Ref Range    Extra Tube Hold for add-ons.    Urinalysis, Microscopic Only - Urine, Clean Catch    Specimen: Urine, Clean Catch   Result Value Ref Range    RBC, UA 0-2 (A) None Seen /HPF    WBC, UA 6-12 (A) None Seen, 0-2, 3-5 /HPF    Bacteria, UA 4+ (A) None Seen /HPF    Squamous Epithelial Cells, UA 3-5 (A) None Seen, 0-2 /HPF    Hyaline Casts, UA 7-12 None Seen /LPF    Methodology Automated Microscopy    Urinalysis With Microscopic If Indicated (No Culture) - Urine, Clean Catch    Specimen: Urine, Clean Catch   Result Value Ref Range    Color, UA Yellow Yellow, Straw, Dark Yellow, [place]    Appearance, UA Cloudy (A) Clear    pH, UA 6.5 5.0 - 9.0    Specific Gravity, UA 1.018 1.003 - 1.030    Glucose, UA Negative Negative    Ketones, UA Negative Negative    Bilirubin, UA Negative Negative    Blood, UA Negative Negative    Protein, UA Negative Negative    Leuk Esterase, UA Moderate (2+) (A) Negative    Nitrite, UA Positive (A) Negative    Urobilinogen, UA 1.0 E.U./dL 0.2 - 1.0 E.U./dL   CBC Auto Differential    Specimen: Blood   Result Value Ref Range    WBC 8.42 3.40 - 10.80 10*3/mm3    RBC 4.34 3.77 - 5.28 10*6/mm3    Hemoglobin 12.4 12.0 - 15.9 g/dL    Hematocrit 38.9 34.0 - 46.6 %    MCV 89.6 79.0 - 97.0 fL    MCH 28.6 26.6 - 33.0 pg    MCHC 31.9 31.5 - 35.7 g/dL    RDW 12.7 12.3 - 15.4 %    RDW-SD 41.9 37.0 - 54.0 fl    MPV 9.9 6.0 - 12.0 fL    Platelets 302 140 - 450 10*3/mm3    Neutrophil % 61.4 42.7 - 76.0 %    Lymphocyte % 27.8 19.6 - 45.3  %    Monocyte % 8.1 5.0 - 12.0 %    Eosinophil % 2.0 0.3 - 6.2 %    Basophil % 0.5 0.0 - 1.5 %    Immature Grans % 0.2 0.0 - 0.5 %    Neutrophils, Absolute 5.17 1.70 - 7.00 10*3/mm3    Lymphocytes, Absolute 2.34 0.70 - 3.10 10*3/mm3    Monocytes, Absolute 0.68 0.10 - 0.90 10*3/mm3    Eosinophils, Absolute 0.17 0.00 - 0.40 10*3/mm3    Basophils, Absolute 0.04 0.00 - 0.20 10*3/mm3    Immature Grans, Absolute 0.02 0.00 - 0.05 10*3/mm3    nRBC 0.0 0.0 - 0.2 /100 WBC   Lavender Top   Result Value Ref Range    Extra Tube hold for add-on    Light Blue Top   Result Value Ref Range    Extra Tube hold for add-on    Troponin    Specimen: Blood   Result Value Ref Range    Troponin T <0.010 0.000 - 0.030 ng/mL   Lipase    Specimen: Blood   Result Value Ref Range    Lipase 36 13 - 60 U/L   Comprehensive Metabolic Panel    Specimen: Blood   Result Value Ref Range    Glucose 82 65 - 99 mg/dL    BUN 15 6 - 20 mg/dL    Creatinine 0.63 0.57 - 1.00 mg/dL    Sodium 141 136 - 145 mmol/L    Potassium 3.3 (L) 3.5 - 5.2 mmol/L    Chloride 103 98 - 107 mmol/L    CO2 28.0 22.0 - 29.0 mmol/L    Calcium 10.3 8.6 - 10.5 mg/dL    Total Protein 7.2 6.0 - 8.5 g/dL    Albumin 4.10 3.50 - 5.20 g/dL    ALT (SGPT) 15 1 - 33 U/L    AST (SGOT) 17 1 - 32 U/L    Alkaline Phosphatase 76 39 - 117 U/L    Total Bilirubin 0.3 0.2 - 1.2 mg/dL    eGFR Non African Amer 97 >60 mL/min/1.73    Globulin 3.1 gm/dL    A/G Ratio 1.3 g/dL    BUN/Creatinine Ratio 23.8 7.0 - 25.0    Anion Gap 10.0 5.0 - 15.0 mmol/L   Results for orders placed or performed in visit on 05/10/18   HPV DNA Probe, Direct - ThinPrep Vial, Vagina    Specimen: Cervix; ThinPrep Vial   Result Value Ref Range    HPV Aptima Negative Negative   Gardnerella vaginalis, Trichomonas vaginalis, Candida albicans, PCR - Swab, Vagina    Specimen: Vagina; Swab   Result Value Ref Range    CANDIDA ALBICANS Negative     GARDNERELLA VAGINALIS Negative     Trichomonas vaginalis PCR Negative    POC Pregnancy, Urine     Specimen: Urine   Result Value Ref Range    HCG, Urine, QL Negative Negative    Lot Number [id number]     Internal Positive Control Positive     Internal Negative Control Positive    Liquid-based Pap Smear, Screening    Specimen: Cervix; ThinPrep Vial   Result Value Ref Range    Case Report       Gynecologic Cytology Report                       Case: [id number]                                  Authorizing Provider:  SUSAN Mary     Collected:           05/10/2018 02:30 PM          Ordering Location:     [facility]     Received:            05/11/2018 01:05 PM                                 [facility]                                                                 First Screen:          Louise Curran                                                           Rescreen:              Donaldo Nevarez MD                                                          Specimen:    Liquid-Based Pap, Screening, Cervix                                                        Interpretation Negative for intraepithelial lesion or malignancy      Other Findings Atrophy with inflammation     Specimen Adequacy       Satisfactory for evaluation, endocervical/transformation zone component present    Additional Information       Disclaimer: Cervical cytology is a screening test primarily for squamous cancer and its precursors and has associated false-negative and false-positive results.  Technologies such as liquid-based preparations may decrease but will not eliminate all false-negative results.  Follow-up of unexplained clinical signs and symptoms is recommended to minimize false-negative results. (The [employer] System for Reporting Cervical Cytology: Anne, 2015).      Embedded Images     Results for orders placed or performed during the hospital encounter of 04/29/18   Urinalysis With / Culture If Indicated - Urine, Clean Catch    Specimen: Urine, Clean Catch   Result Value Ref Range    Color, UA Yellow  Yellow, Straw, Dark Yellow, [place]    Appearance, UA Clear Clear    pH, UA 8.0 5.0 - 9.0    Specific Gravity, UA 1.020 1.003 - 1.030    Glucose, UA Negative Negative    Ketones, UA Negative Negative    Bilirubin, UA Negative Negative    Blood, UA Negative Negative    Protein, UA Negative Negative    Leuk Esterase, UA Negative Negative    Nitrite, UA Negative Negative    Urobilinogen, UA 0.2 E.U./dL 0.2 - 1.0 E.U./dL   CBC Auto Differential    Specimen: Blood   Result Value Ref Range    WBC 6.27 3.20 - 9.80 10*3/mm3    RBC 4.52 3.77 - 5.16 10*6/mm3    Hemoglobin 13.3 12.0 - 15.5 g/dL    Hematocrit 39.7 35.0 - 45.0 %    MCV 87.8 80.0 - 98.0 fL    MCH 29.4 26.5 - 34.0 pg    MCHC 33.5 31.4 - 36.0 g/dL    RDW 12.5 11.5 - 14.5 %    RDW-SD 40.1 36.4 - 46.3 fl    MPV 9.8 8.0 - 12.0 fL    Platelets 273 150 - 450 10*3/mm3    Neutrophil % 59.6 37.0 - 80.0 %    Lymphocyte % 30.5 10.0 - 50.0 %    Monocyte % 7.8 0.0 - 12.0 %    Eosinophil % 1.6 0.0 - 7.0 %    Basophil % 0.3 0.0 - 2.0 %    Immature Grans % 0.2 0.0 - 0.5 %    Neutrophils, Absolute 3.74 2.00 - 8.60 10*3/mm3    Lymphocytes, Absolute 1.91 0.60 - 4.20 10*3/mm3    Monocytes, Absolute 0.49 0.00 - 0.90 10*3/mm3    Eosinophils, Absolute 0.10 0.00 - 0.70 10*3/mm3    Basophils, Absolute 0.02 0.00 - 0.20 10*3/mm3    Immature Grans, Absolute 0.01 0.00 - 0.02 10*3/mm3     *Note: Due to a large number of results and/or encounters for the requested time period, some results have not been displayed. A complete set of results can be found in Results Review.

## 2022-09-19 ENCOUNTER — HOSPITAL ENCOUNTER (OUTPATIENT)
Facility: HOSPITAL | Age: 62
Setting detail: HOSPITAL OUTPATIENT SURGERY
Discharge: HOME | End: 2022-09-19
Attending: INTERNAL MEDICINE | Admitting: INTERNAL MEDICINE

## 2022-09-19 ENCOUNTER — ANESTHESIA (OUTPATIENT)
Dept: GASTROENTEROLOGY | Facility: HOSPITAL | Age: 62
End: 2022-09-19

## 2022-09-19 ENCOUNTER — ANESTHESIA EVENT (OUTPATIENT)
Dept: GASTROENTEROLOGY | Facility: HOSPITAL | Age: 62
End: 2022-09-19

## 2022-09-19 VITALS
OXYGEN SATURATION: 95 % | SYSTOLIC BLOOD PRESSURE: 117 MMHG | TEMPERATURE: 97.4 F | HEART RATE: 63 BPM | HEIGHT: 61 IN | WEIGHT: 132 LBS | DIASTOLIC BLOOD PRESSURE: 56 MMHG | BODY MASS INDEX: 24.92 KG/M2 | RESPIRATION RATE: 20 BRPM

## 2022-09-19 DIAGNOSIS — R19.7 DIARRHEA, UNSPECIFIED TYPE: ICD-10-CM

## 2022-09-19 DIAGNOSIS — R19.4 CHANGE IN BOWEL HABITS: ICD-10-CM

## 2022-09-19 DIAGNOSIS — K21.00 GASTROESOPHAGEAL REFLUX DISEASE WITH ESOPHAGITIS WITHOUT HEMORRHAGE: ICD-10-CM

## 2022-09-19 DIAGNOSIS — R10.30 LOWER ABDOMINAL PAIN: ICD-10-CM

## 2022-09-19 PROCEDURE — 45380 COLONOSCOPY AND BIOPSY: CPT | Performed by: INTERNAL MEDICINE

## 2022-09-19 PROCEDURE — 45385 COLONOSCOPY W/LESION REMOVAL: CPT | Performed by: INTERNAL MEDICINE

## 2022-09-19 PROCEDURE — 43239 EGD BIOPSY SINGLE/MULTIPLE: CPT | Performed by: INTERNAL MEDICINE

## 2022-09-19 PROCEDURE — 25010000002 PROPOFOL 10 MG/ML EMULSION: Performed by: NURSE ANESTHETIST, CERTIFIED REGISTERED

## 2022-09-19 RX ORDER — DEXTROSE AND SODIUM CHLORIDE 5; .45 G/100ML; G/100ML
30 INJECTION, SOLUTION INTRAVENOUS CONTINUOUS PRN
Status: DISCONTINUED | OUTPATIENT
Start: 2022-09-19 | End: 2022-09-19 | Stop reason: HOSPADM

## 2022-09-19 RX ORDER — PROPOFOL 10 MG/ML
VIAL (ML) INTRAVENOUS AS NEEDED
Status: DISCONTINUED | OUTPATIENT
Start: 2022-09-19 | End: 2022-09-19 | Stop reason: SURG

## 2022-09-19 RX ORDER — LIDOCAINE HYDROCHLORIDE 20 MG/ML
INJECTION, SOLUTION INTRAVENOUS AS NEEDED
Status: DISCONTINUED | OUTPATIENT
Start: 2022-09-19 | End: 2022-09-19 | Stop reason: SURG

## 2022-09-19 RX ADMIN — PROPOFOL 20 MG: 10 INJECTION, EMULSION INTRAVENOUS at 16:24

## 2022-09-19 RX ADMIN — PROPOFOL 20 MG: 10 INJECTION, EMULSION INTRAVENOUS at 16:26

## 2022-09-19 RX ADMIN — LIDOCAINE HYDROCHLORIDE 60 MG: 20 INJECTION, SOLUTION INTRAVENOUS at 16:13

## 2022-09-19 RX ADMIN — PROPOFOL 20 MG: 10 INJECTION, EMULSION INTRAVENOUS at 16:22

## 2022-09-19 RX ADMIN — DEXTROSE AND SODIUM CHLORIDE 30 ML/HR: 5; 450 INJECTION, SOLUTION INTRAVENOUS at 15:25

## 2022-09-19 RX ADMIN — PROPOFOL 60 MG: 10 INJECTION, EMULSION INTRAVENOUS at 16:13

## 2022-09-19 RX ADMIN — PROPOFOL 40 MG: 10 INJECTION, EMULSION INTRAVENOUS at 16:20

## 2022-09-19 RX ADMIN — PROPOFOL 40 MG: 10 INJECTION, EMULSION INTRAVENOUS at 16:17

## 2022-09-19 RX ADMIN — PROPOFOL 40 MG: 10 INJECTION, EMULSION INTRAVENOUS at 16:15

## 2022-09-19 NOTE — H&P
No chief complaint on file.      Subjective    Sondra Felder is a 62 y.o. female. she is here today for follow-up.    62-year-old female presents to discuss change in bowel habits diarrhea and chronic GERD.  She was seen by Dr. Cruz April 1, 2021.  Reports she was scheduled for EGD and colonoscopy at that time but was nervous to undergo procedures and do cleanouts have canceled.  Was having dysphagia at that time and reports that has resolved but she is still having frequent episodes of acid indigestion Nexium helps somewhat but does not fully control symptoms reports intermittent nausea states over the last 6 or 7 months she began to have a change in bowel habits described as watery stool typically 5 or 6 times per day but up to 10 on bad days.  Denies melena or hematochezia.  Reports nocturnal stooling at times.    Diarrhea   This is a chronic problem. The current episode started more than 1 month ago (about 6-7 months ago ). The problem occurs 5 to 10 times per day. The stool consistency is described as watery. The patient states that diarrhea awakens her from sleep. Associated symptoms include abdominal pain and bloating. Pertinent negatives include no arthralgias, chills, coughing, fever, headaches, increased  flatus, myalgias or vomiting.     Plan; EGD due to chronic GERD abdominal pain diarrhea will obtain biopsy rule out gluten intolerance colonoscopy due to change in bowel habits increased amounts of diarrhea will obtain biopsy to rule out microscopic colitis or inflammatory bowel disease.       The following portions of the patient's history were reviewed and updated as appropriate:   Past Medical History:   Diagnosis Date   • Adenomatous polyp of colon    • Anxiety    • Carpal tunnel syndrome    • Depression    • Diabetes (HCC)    • Exanthematous disorder    • GERD (gastroesophageal reflux disease)    • Hiatal hernia    • IBS (irritable bowel syndrome)    • Osteoporosis    • Umbilical hernia    •  URI (upper respiratory infection)      Past Surgical History:   Procedure Laterality Date   • CATARACT EXTRACTION W/ INTRAOCULAR LENS IMPLANT Right 10/9/2020    Procedure: CATARACT PHACO EXTRACTION WITH INTRAOCULAR LENS IMPLANT            (LATEX ALLERGY);  Surgeon: Agustin Ferguson MD;  Location: [facility];  Service: Ophthalmology;  Laterality: Right;   • CATARACT EXTRACTION W/ INTRAOCULAR LENS IMPLANT Left 10/16/2020    Procedure: REMOVE CATARACT AND IMPLANT  INTRAOCULAR LENS                 (latex allergy);  Surgeon: Agustin Ferguson MD;  Location: [facility];  Service: Ophthalmology;  Laterality: Left;   •  SECTION     • ENDOSCOPY  10/25/2002    Hiatal hernia, Veronica's esophagus.   • ENDOSCOPY AND COLONOSCOPY      Hemorrhoids   • INJECTION OF MEDICATION  2010    Kenalog (3)     • INJECTION OF MEDICATION  2009    Rocephin (1)      • SHOULDER SURGERY  2004    Impingement syndrome of the left shoulder. arthroscopic subacromial decompression, left shoulder., dr Duff   • TUBAL ABDOMINAL LIGATION       Family History   Problem Relation Age of Onset   • Colon cancer Mother    • Breast cancer Sister    • Ovarian cancer Sister    • Ovarian cancer Daughter      OB History        2    Para   2    Term   2            AB        Living           SAB        IAB        Ectopic        Molar        Multiple        Live Births                  Prior to Admission medications    Medication Sig Start Date End Date Taking? Authorizing Provider   alendronate (FOSAMAX) 70 MG tablet TAKE ONE TABLET BY MOUTH ONCE A WEEK 30 minutes BEFORE THE first food, beverage OR medicine of THE DAY with plain water 21   Lorenzo Edgar MD   calcium carbonate (OS-AKSHAT) 600 MG tablet Take 600 mg by mouth Daily.    Lorenzo Edgar MD   esomeprazole (nexIUM) 20 MG capsule Daily.    Lorenzo Edgar MD   gabapentin (NEURONTIN) 100 MG capsule Take 100 mg by mouth 3 (Three) Times a Day.  "   Provider, MD Lorenzo   HYDROcodone-acetaminophen (NORCO)  MG per tablet Take 1 tablet by mouth.    ProviderLorenzo MD   QUEtiapine Fumarate (SEROQUEL PO) Take  by mouth Every Night.    ProviderLorenzo MD     Allergies   Allergen Reactions   • Codeine Anaphylaxis and Palpitations   • Latex Itching and Anaphylaxis     Causes skin peeling   • Flexeril [Cyclobenzaprine] Other (See Comments)     CAUSES BRADYCARDIA     Social History     Socioeconomic History   • Marital status:    Tobacco Use   • Smoking status: Never Smoker   • Smokeless tobacco: Never Used   Substance and Sexual Activity   • Alcohol use: No   • Drug use: No   • Sexual activity: Not Currently     Partners: Male       Review of Systems  Review of Systems   Constitutional: Positive for appetite change (early satiety, decreased appetite ), fatigue and unexpected weight change (down 13 pounds ). Negative for activity change, chills, diaphoresis and fever.   Respiratory: Negative for cough.    Gastrointestinal: Positive for abdominal pain, bloating, diarrhea and nausea. Negative for abdominal distention, anal bleeding, blood in stool, constipation, flatus, rectal pain and vomiting.   Musculoskeletal: Negative for arthralgias and myalgias.   Neurological: Negative for headaches.        Ht 154.9 cm (61\")   Wt 61.7 kg (136 lb)   BMI 25.70 kg/m²    /56   Pulse 63   Temp 97.4 °F (36.3 °C)   Resp 20   Ht 154.9 cm (61\")   Wt 59.9 kg (132 lb)   SpO2 95%   BMI 24.94 kg/m²       Objective    Physical Exam  Constitutional:       General: She is not in acute distress.     Appearance: Normal appearance. She is normal weight. She is not ill-appearing.   HENT:      Head: Normocephalic and atraumatic.   Pulmonary:      Effort: Pulmonary effort is normal.   Abdominal:      General: Abdomen is flat. Bowel sounds are normal. There is no distension.      Palpations: Abdomen is soft. There is no mass.      Tenderness: There is " abdominal tenderness in the right lower quadrant, epigastric area and left lower quadrant.   Neurological:      Mental Status: She is alert.       Lab on 10/13/2020   Component Date Value Ref Range Status   • SARS-CoV-2, TANNA 10/13/2020 Not Detected  Not Detected Final    Comment: This nucleic acid amplification test was developed and its performance  characteristics determined by [employer]. Nucleic acid  amplification tests include PCR and TMA. This test has not been FDA  cleared or approved. This test has been authorized by FDA under an  Emergency Use Authorization (EUA). This test is only authorized for  the duration of time the declaration that circumstances exist  justifying the authorization of the emergency use of in vitro  diagnostic tests for detection of SARS-CoV-2 virus and/or diagnosis  of COVID-19 infection under section 564(b)(1) of the Act, 21 U.S.C.  360bbb-3(b) (1), unless the authorization is terminated or revoked  sooner.  When diagnostic testing is negative, the possibility of a false  negative result should be considered in the context of a patient's  recent exposures and the presence of clinical signs and symptoms  consistent with COVID-19. An individual without symptoms of COVID-19  and who is not shedding SARS-CoV-2 virus would                            expect to have a  negative (not detected) result in this assay.   • COVID [facility] PRIORITY 10/13/2020 Comment   Final    Received     Assessment & Plan      No diagnosis found..       Orders placed during this encounter include:  No orders of the defined types were placed in this encounter.      ESOPHAGOGASTRODUODENOSCOPY (N/A), COLONOSCOPY (N/A)    Review and/or summary of lab tests, radiology, procedures, medications. Review and summary of old records and obtaining of history. The risks and benefits of my recommendations, as well as other treatment options were discussed with the patient today. Questions were answered.    No  orders of the defined types were placed in this encounter.      Follow-up: No follow-ups on file.          This document has been electronically signed by Nathaniel Cruz MD on September 19, 2022 14:42 CDT           I spent 20 minutes caring for Sondra on this date of service. This time includes time spent by me in the following activities:preparing for the visit, reviewing tests, obtaining and/or reviewing a separately obtained history, performing a medically appropriate examination and/or evaluation , counseling and educating the patient/family/caregiver, ordering medications, tests, or procedures, referring and communicating with other health care professionals , documenting information in the medical record and care coordination    Results for orders placed or performed in visit on 10/13/20   COVID [facility] Priority - Swab, Nasopharynx    Specimen: Nasopharynx; Swab   Result Value Ref Range    COVID [facility] PRIORITY Comment    COVID-19,[facility] ROUTINE, NP/OP SWAB IN TRANSPORT MEDIA OR ESWAB 72 HR TAT - Swab, Nasopharynx    Specimen: Nasopharynx; Swab   Result Value Ref Range    SARS-CoV-2, TANNA Not Detected Not Detected   Results for orders placed or performed during the hospital encounter of 10/09/20   POC Glucose Once    Specimen: Blood   Result Value Ref Range    Glucose 80 70 - 130 mg/dL   Results for orders placed or performed in visit on 10/06/20   COVID [facility] Priority - Swab, Nasopharynx    Specimen: Nasopharynx; Swab   Result Value Ref Range    COVID [facility] PRIORITY Comment    COVID-19,[facility] ROUTINE, NP/OP SWAB IN TRANSPORT MEDIA OR ESWAB 72 HR TAT - Swab, Nasopharynx    Specimen: Nasopharynx; Swab   Result Value Ref Range    SARS-CoV-2, TANNA Not Detected Not Detected   Results for orders placed or performed during the hospital encounter of 11/20/19   Gold Top - SST   Result Value Ref Range    Extra Tube Hold for add-ons.    Green Top (Gel)   Result Value Ref Range    Extra Tube Hold for add-ons.     Urinalysis, Microscopic Only - Urine, Clean Catch    Specimen: Urine, Clean Catch   Result Value Ref Range    RBC, UA 0-2 (A) None Seen /HPF    WBC, UA 6-12 (A) None Seen, 0-2, 3-5 /HPF    Bacteria, UA 4+ (A) None Seen /HPF    Squamous Epithelial Cells, UA 3-5 (A) None Seen, 0-2 /HPF    Hyaline Casts, UA 7-12 None Seen /LPF    Methodology Automated Microscopy    Urinalysis With Microscopic If Indicated (No Culture) - Urine, Clean Catch    Specimen: Urine, Clean Catch   Result Value Ref Range    Color, UA Yellow Yellow, Straw, Dark Yellow, [place]    Appearance, UA Cloudy (A) Clear    pH, UA 6.5 5.0 - 9.0    Specific Gravity, UA 1.018 1.003 - 1.030    Glucose, UA Negative Negative    Ketones, UA Negative Negative    Bilirubin, UA Negative Negative    Blood, UA Negative Negative    Protein, UA Negative Negative    Leuk Esterase, UA Moderate (2+) (A) Negative    Nitrite, UA Positive (A) Negative    Urobilinogen, UA 1.0 E.U./dL 0.2 - 1.0 E.U./dL   CBC Auto Differential    Specimen: Blood   Result Value Ref Range    WBC 8.42 3.40 - 10.80 10*3/mm3    RBC 4.34 3.77 - 5.28 10*6/mm3    Hemoglobin 12.4 12.0 - 15.9 g/dL    Hematocrit 38.9 34.0 - 46.6 %    MCV 89.6 79.0 - 97.0 fL    MCH 28.6 26.6 - 33.0 pg    MCHC 31.9 31.5 - 35.7 g/dL    RDW 12.7 12.3 - 15.4 %    RDW-SD 41.9 37.0 - 54.0 fl    MPV 9.9 6.0 - 12.0 fL    Platelets 302 140 - 450 10*3/mm3    Neutrophil % 61.4 42.7 - 76.0 %    Lymphocyte % 27.8 19.6 - 45.3 %    Monocyte % 8.1 5.0 - 12.0 %    Eosinophil % 2.0 0.3 - 6.2 %    Basophil % 0.5 0.0 - 1.5 %    Immature Grans % 0.2 0.0 - 0.5 %    Neutrophils, Absolute 5.17 1.70 - 7.00 10*3/mm3    Lymphocytes, Absolute 2.34 0.70 - 3.10 10*3/mm3    Monocytes, Absolute 0.68 0.10 - 0.90 10*3/mm3    Eosinophils, Absolute 0.17 0.00 - 0.40 10*3/mm3    Basophils, Absolute 0.04 0.00 - 0.20 10*3/mm3    Immature Grans, Absolute 0.02 0.00 - 0.05 10*3/mm3    nRBC 0.0 0.0 - 0.2 /100 WBC   Lavender Top   Result Value Ref Range    Extra Tube  hold for add-on    Light Blue Top   Result Value Ref Range    Extra Tube hold for add-on    Troponin    Specimen: Blood   Result Value Ref Range    Troponin T <0.010 0.000 - 0.030 ng/mL   Lipase    Specimen: Blood   Result Value Ref Range    Lipase 36 13 - 60 U/L   Comprehensive Metabolic Panel    Specimen: Blood   Result Value Ref Range    Glucose 82 65 - 99 mg/dL    BUN 15 6 - 20 mg/dL    Creatinine 0.63 0.57 - 1.00 mg/dL    Sodium 141 136 - 145 mmol/L    Potassium 3.3 (L) 3.5 - 5.2 mmol/L    Chloride 103 98 - 107 mmol/L    CO2 28.0 22.0 - 29.0 mmol/L    Calcium 10.3 8.6 - 10.5 mg/dL    Total Protein 7.2 6.0 - 8.5 g/dL    Albumin 4.10 3.50 - 5.20 g/dL    ALT (SGPT) 15 1 - 33 U/L    AST (SGOT) 17 1 - 32 U/L    Alkaline Phosphatase 76 39 - 117 U/L    Total Bilirubin 0.3 0.2 - 1.2 mg/dL    eGFR Non African Amer 97 >60 mL/min/1.73    Globulin 3.1 gm/dL    A/G Ratio 1.3 g/dL    BUN/Creatinine Ratio 23.8 7.0 - 25.0    Anion Gap 10.0 5.0 - 15.0 mmol/L   Results for orders placed or performed in visit on 05/10/18   HPV DNA Probe, Direct - ThinPrep Vial, Vagina    Specimen: Cervix; ThinPrep Vial   Result Value Ref Range    HPV Aptima Negative Negative   Gardnerella vaginalis, Trichomonas vaginalis, Candida albicans, PCR - Swab, Vagina    Specimen: Vagina; Swab   Result Value Ref Range    CANDIDA ALBICANS Negative     GARDNERELLA VAGINALIS Negative     Trichomonas vaginalis PCR Negative    POC Pregnancy, Urine    Specimen: Urine   Result Value Ref Range    HCG, Urine, QL Negative Negative    Lot Number [id number]     Internal Positive Control Positive     Internal Negative Control Positive    Liquid-based Pap Smear, Screening    Specimen: Cervix; ThinPrep Vial   Result Value Ref Range    Case Report       Gynecologic Cytology Report                       Case: [id number]                                  Authorizing Provider:  SUSAN Mary     Collected:           05/10/2018 02:30 PM          Ordering Location:      [facility]     Received:            05/11/2018 01:05 PM                                 [facility]                                                                 First Screen:          Louise Curran                                                           Rescreen:              Donaldo Nevarez MD                                                          Specimen:    Liquid-Based Pap, Screening, Cervix                                                        Interpretation Negative for intraepithelial lesion or malignancy      Other Findings Atrophy with inflammation     Specimen Adequacy       Satisfactory for evaluation, endocervical/transformation zone component present    Additional Information       Disclaimer: Cervical cytology is a screening test primarily for squamous cancer and its precursors and has associated false-negative and false-positive results.  Technologies such as liquid-based preparations may decrease but will not eliminate all false-negative results.  Follow-up of unexplained clinical signs and symptoms is recommended to minimize false-negative results. (The [place] System for Reporting Cervical Cytology: Anne, 2015).      Embedded Images     Results for orders placed or performed during the hospital encounter of 04/29/18   Urinalysis With / Culture If Indicated - Urine, Clean Catch    Specimen: Urine, Clean Catch   Result Value Ref Range    Color, UA Yellow Yellow, Straw, Dark Yellow, [place]    Appearance, UA Clear Clear    pH, UA 8.0 5.0 - 9.0    Specific Gravity, UA 1.020 1.003 - 1.030    Glucose, UA Negative Negative    Ketones, UA Negative Negative    Bilirubin, UA Negative Negative    Blood, UA Negative Negative    Protein, UA Negative Negative    Leuk Esterase, UA Negative Negative    Nitrite, UA Negative Negative    Urobilinogen, UA 0.2 E.U./dL 0.2 - 1.0 E.U./dL   CBC Auto Differential    Specimen: Blood   Result Value Ref Range    WBC 6.27 3.20 - 9.80 10*3/mm3     RBC 4.52 3.77 - 5.16 10*6/mm3    Hemoglobin 13.3 12.0 - 15.5 g/dL    Hematocrit 39.7 35.0 - 45.0 %    MCV 87.8 80.0 - 98.0 fL    MCH 29.4 26.5 - 34.0 pg    MCHC 33.5 31.4 - 36.0 g/dL    RDW 12.5 11.5 - 14.5 %    RDW-SD 40.1 36.4 - 46.3 fl    MPV 9.8 8.0 - 12.0 fL    Platelets 273 150 - 450 10*3/mm3    Neutrophil % 59.6 37.0 - 80.0 %    Lymphocyte % 30.5 10.0 - 50.0 %    Monocyte % 7.8 0.0 - 12.0 %    Eosinophil % 1.6 0.0 - 7.0 %    Basophil % 0.3 0.0 - 2.0 %    Immature Grans % 0.2 0.0 - 0.5 %    Neutrophils, Absolute 3.74 2.00 - 8.60 10*3/mm3    Lymphocytes, Absolute 1.91 0.60 - 4.20 10*3/mm3    Monocytes, Absolute 0.49 0.00 - 0.90 10*3/mm3    Eosinophils, Absolute 0.10 0.00 - 0.70 10*3/mm3    Basophils, Absolute 0.02 0.00 - 0.20 10*3/mm3    Immature Grans, Absolute 0.01 0.00 - 0.02 10*3/mm3     *Note: Due to a large number of results and/or encounters for the requested time period, some results have not been displayed. A complete set of results can be found in Results Review.

## 2022-09-19 NOTE — ANESTHESIA PREPROCEDURE EVALUATION
Anesthesia Evaluation     Patient summary reviewed and Nursing notes reviewed   history of anesthetic complications: prolonged sedation  NPO Solid Status: > 8 hours  NPO Liquid Status: > 8 hours           Airway   Mallampati: II  TM distance: >3 FB  Neck ROM: full  Possible difficult intubation  Dental    (+) edentulous    Pulmonary - normal exam    breath sounds clear to auscultation  (-) asthma, sleep apnea, rhonchi, decreased breath sounds, wheezes, not a smoker  Cardiovascular   Exercise tolerance: poor (<4 METS)    Rhythm: regular  Rate: normal    (+) dysrhythmias Bradycardia,   (-) pacemaker, hypertension, valvular problems/murmurs, past MI, CAD, angina, murmur, cardiac stents, CABG, DVT, hyperlipidemia      Neuro/Psych  (+) TIA (2015), numbness (sciatica), psychiatric history Anxiety and Depression,    (-) seizures  GI/Hepatic/Renal/Endo    (+)  hiatal hernia, GERD well controlled,    (-)  obesity, morbid obesity, hepatitis, liver disease, no renal disease, diabetes, no thyroid disorder    Musculoskeletal     (+) arthralgias, myalgias, radiculopathy Right lower extremity  Abdominal  - normal exam  (-) obese   Substance History   (-) alcohol use, drug use     OB/GYN negative ob/gyn ROS         Other   arthritis,      (-) history of cancer  ROS/Med Hx Other: Hx of TIA x2- no sequelae- last one was 2015                    Anesthesia Plan    ASA 3     MAC     intravenous induction     Anesthetic plan, risks, benefits, and alternatives have been provided, discussed and informed consent has been obtained with: patient.

## 2022-09-19 NOTE — ANESTHESIA POSTPROCEDURE EVALUATION
Patient: Sondra Felder    Procedure Summary     Date: 09/19/22 Room / Location: [facility] ENDOSCOPY 1 / [facility] ENDOSCOPY    Anesthesia Start: 1607 Anesthesia Stop: 1629    Procedures:       ESOPHAGOGASTRODUODENOSCOPY (N/A )      COLONOSCOPY (N/A ) Diagnosis:       Gastroesophageal reflux disease with esophagitis without hemorrhage      Lower abdominal pain      Change in bowel habits      Diarrhea, unspecified type      (Gastroesophageal reflux disease with esophagitis without hemorrhage [K21.00])      (Lower abdominal pain [R10.30])      (Change in bowel habits [R19.4])      (Diarrhea, unspecified type [R19.7])    Surgeons: Nathaniel Cruz MD Provider: Puneet Giraldo CRNA    Anesthesia Type: MAC ASA Status: 3          Anesthesia Type: MAC    Vitals  No vitals data found for the desired time range.          Post Anesthesia Care and Evaluation    Patient location during evaluation: bedside  Patient participation: complete - patient participated  Level of consciousness: sleepy but conscious  Pain score: 0  Pain management: adequate    Airway patency: patent  Anesthetic complications: No anesthetic complications  PONV Status: none  Cardiovascular status: acceptable  Respiratory status: acceptable  Hydration status: acceptable    Comments: ---------------------------               09/19/22 1629         ---------------------------   BP:          104/52      Pulse:         56           Resp:          18           Temp:   97.6 °F (36.4 °C)   SpO2:          97%         ---------------------------

## 2022-09-21 LAB — REF LAB TEST METHOD: NORMAL

## 2022-09-27 ENCOUNTER — OFFICE VISIT (OUTPATIENT)
Dept: GASTROENTEROLOGY | Facility: CLINIC | Age: 62
End: 2022-09-27

## 2022-09-27 VITALS
HEIGHT: 61 IN | SYSTOLIC BLOOD PRESSURE: 114 MMHG | WEIGHT: 138 LBS | HEART RATE: 57 BPM | DIASTOLIC BLOOD PRESSURE: 64 MMHG | BODY MASS INDEX: 26.06 KG/M2

## 2022-09-27 DIAGNOSIS — K58.0 IRRITABLE BOWEL SYNDROME WITH DIARRHEA: ICD-10-CM

## 2022-09-27 DIAGNOSIS — K21.9 GASTROESOPHAGEAL REFLUX DISEASE WITHOUT ESOPHAGITIS: Primary | ICD-10-CM

## 2022-09-27 PROCEDURE — 99213 OFFICE O/P EST LOW 20 MIN: CPT | Performed by: NURSE PRACTITIONER

## 2022-09-27 RX ORDER — OMEPRAZOLE 40 MG/1
40 CAPSULE, DELAYED RELEASE ORAL DAILY
Qty: 30 CAPSULE | Refills: 11 | Status: SHIPPED | OUTPATIENT
Start: 2022-09-27

## 2022-09-27 NOTE — PROGRESS NOTES
Chief Complaint   Patient presents with   • Diarrhea       Subjective    Sondra Felder is a 62 y.o. female. she is here today for follow-up.    62-year-old female presents to discuss chronic diarrhea and GERD.  Reports her movements are still 5-10 times per day described as watery does awaken her from sleep.  Denies any recent melena or hematochezia.  Symptoms have been ongoing for last several months.    Diarrhea   This is a chronic problem. The problem occurs 5 to 10 times per day. The problem has been waxing and waning. The stool consistency is described as watery. The patient states that diarrhea does not awaken her from sleep. Associated symptoms include abdominal pain, bloating and vomiting. Pertinent negatives include no arthralgias, chills, coughing, fever, headaches, increased  flatus or myalgias. The symptoms are aggravated by stress. She has tried anti-motility drug and change of diet for the symptoms.   Heartburn  She complains of abdominal pain and nausea. She reports no coughing or no sore throat. The symptoms are aggravated by bending and certain foods. Associated symptoms include fatigue. She has tried a PPI for the symptoms.     EGD noted grade 3 esophagitis gastritis and normal duodenum.  Antral biopsy noted reactive antral type mucosa negative for H. pylori.  Esophageal biopsy noted squamous epithelium with only rare intraepithelial eosinophils negative for metaplasia dysplasia or malignancy.  Small bowel biopsy noted duodenal mucosa with no diagnostic abnormality.  Colonoscopy noted adequate prep 2 sessile polyps removed from sigmoid and descending colon lipoma noted in the ascending colon external/internal hemorrhoids were found several biopsies were obtained which noted normal colonic mucosa.  Sigmoid colon and descending colon polyp found to be adenomatous repeat recommended in 3 years for surveillance.        The following portions of the patient's history were reviewed and updated as  appropriate:   Past Medical History:   Diagnosis Date   • Adenomatous polyp of colon    • Anxiety    • Carpal tunnel syndrome    • Depression    • Diabetes (HCC)    • Exanthematous disorder    • GERD (gastroesophageal reflux disease)    • Hiatal hernia    • IBS (irritable bowel syndrome)    • Osteoporosis    • Umbilical hernia    • URI (upper respiratory infection)      Past Surgical History:   Procedure Laterality Date   • CATARACT EXTRACTION W/ INTRAOCULAR LENS IMPLANT Right 10/9/2020    Procedure: CATARACT PHACO EXTRACTION WITH INTRAOCULAR LENS IMPLANT            (LATEX ALLERGY);  Surgeon: Agustin Ferguson MD;  Location: [facility];  Service: Ophthalmology;  Laterality: Right;   • CATARACT EXTRACTION W/ INTRAOCULAR LENS IMPLANT Left 10/16/2020    Procedure: REMOVE CATARACT AND IMPLANT  INTRAOCULAR LENS                 (latex allergy);  Surgeon: Agustin Ferguson MD;  Location: [facility];  Service: Ophthalmology;  Laterality: Left;   •  SECTION     • ENDOSCOPY  10/25/2002    Hiatal hernia, Veroinca's esophagus.   • ENDOSCOPY AND COLONOSCOPY      Hemorrhoids   • INJECTION OF MEDICATION  2010    Kenalog (3)     • INJECTION OF MEDICATION  2009    Rocephin (1)      • SHOULDER SURGERY  2004    Impingement syndrome of the left shoulder. arthroscopic subacromial decompression, left shoulder., dr Duff   • TUBAL ABDOMINAL LIGATION       Family History   Problem Relation Age of Onset   • Colon cancer Mother    • Breast cancer Sister    • Ovarian cancer Sister    • Ovarian cancer Daughter      OB History        2    Para   2    Term   2            AB        Living           SAB        IAB        Ectopic        Molar        Multiple        Live Births                  Prior to Admission medications    Medication Sig Start Date End Date Taking? Authorizing Provider   alendronate (FOSAMAX) 70 MG tablet TAKE ONE TABLET BY MOUTH ONCE A WEEK 30 minutes BEFORE THE first food,  beverage OR medicine of THE DAY with plain water 2/24/21  Yes Provider, MD Lorenzo   calcium carbonate (OS-AKSHAT) 600 MG tablet Take 600 mg by mouth Daily.   Yes Provider, MD Lorenzo   esomeprazole (nexIUM) 20 MG capsule Daily.   Yes Provider, MD Lorenzo   gabapentin (NEURONTIN) 100 MG capsule Take 100 mg by mouth 3 (Three) Times a Day.   Yes Provider, MD Lorenzo   HYDROcodone-acetaminophen (NORCO)  MG per tablet Take 1 tablet by mouth.   Yes Provider, MD Lorenzo   QUEtiapine Fumarate (SEROQUEL PO) Take  by mouth Every Night.   Yes Provider, MD Lorenzo   PEG-KCl-NaCl-NaSulf-Na Asc-C (MoviPrep) 100 g reconstituted solution powder Take 1,000 mL by mouth Every 12 (Twelve) Hours. 8/3/22 9/27/22  Ayanna Hernandez APRN     Allergies   Allergen Reactions   • Codeine Anaphylaxis and Palpitations   • Latex Itching and Anaphylaxis     Causes skin peeling   • Flexeril [Cyclobenzaprine] Other (See Comments)     CAUSES BRADYCARDIA     Social History     Socioeconomic History   • Marital status:    Tobacco Use   • Smoking status: Never Smoker   • Smokeless tobacco: Never Used   Substance and Sexual Activity   • Alcohol use: No   • Drug use: No   • Sexual activity: Not Currently     Partners: Male       Review of Systems  Review of Systems   Constitutional: Positive for fatigue. Negative for activity change, appetite change, chills, diaphoresis, fever and unexpected weight change.   HENT: Negative for sore throat and trouble swallowing.    Respiratory: Negative for cough and shortness of breath.    Gastrointestinal: Positive for abdominal pain, bloating, diarrhea, nausea and vomiting. Negative for abdominal distention, anal bleeding, blood in stool, constipation, flatus and rectal pain.   Musculoskeletal: Negative for arthralgias and myalgias.   Skin: Negative for pallor.   Neurological: Negative for light-headedness and headaches.        /64 (BP Location: Left arm)   Pulse 57   Ht 154.9 cm  "(61\")   Wt 62.6 kg (138 lb)   BMI 26.07 kg/m²     Objective    Physical Exam  Constitutional:       General: She is not in acute distress.     Appearance: Normal appearance. She is normal weight. She is not ill-appearing.   HENT:      Head: Normocephalic and atraumatic.   Pulmonary:      Effort: Pulmonary effort is normal.   Abdominal:      General: Abdomen is flat. Bowel sounds are normal. There is no distension.      Palpations: Abdomen is soft. There is no mass.      Tenderness: There is abdominal tenderness.   Neurological:      Mental Status: She is alert.       Admission on 2022, Discharged on 2022   Component Date Value Ref Range Status   • Reference Lab Report 2022    Final                    Value:[facility]  [address] [place] [address]  Phone: [phone] or [phone]  Fax: [phone]  Donaldo Lozano M.D., Medical Director    PATIENT NAME                           LABORATORY NO.  SOULEYMANE HALE.                   [id number]  [id number]                         AGE              SEX  SSN           CLIENT REF #  [facility]           62      1960  F    [id number]   [id number]    Ray                       REQUESTING M.D.     ATTENDING M.D.     COPY TO[address]  DATE COLLECTED      DATE RECEIVED      DATE REPORTED  2022    DIAGNOSIS:  A.   SMALL BOWEL, BIOPSY:  Duodenal mucosa with no diagnostic abnormality; negative for  malabsorption pattern and microorganisms.  B.   ANTRUM, BIOPSY:  Reactive antral type mucosa.  No H. pylori-like                           organisms identified on H&E.  C.   ESOPHAGUS, BIOPSY, DISTAL:  Squamous epithelium with no or only rare intraepithelial eosinophils  (<5/HPF). Negative for intestinal metaplasia, " "dysplasia, or malignancy.  D.   DESCENDING COLON POLYP:  Tubular adenoma.  E.   COLON, BIOPSY:  Colonic mucosa with no diagnostic abnormality; negative for  microscopic colitis pattern.  F.   SIGMOID COLON POLYP:  Tubular adenoma.    WJB    CLINICAL HISTORY:  Gastroesophageal reflux disease with esophagitis without hemorrhage, lower  abdominal pain, change in bowel habits, diarrhea, unspecified type    SPECIMENS RECEIVED:  A.  SMALL BOWEL, BIOPSY  B.  ANTRUM, BIOPSY  C.  ESOPHAGUS, BIOPSY, DISTAL  D.  DESCENDING COLON POLYP  E.  COLON, BIOPSY  F.  SIGMOID COLON POLYP    MICROSCOPIC DESCRIPTION:  Tissue blocks are prepared and slides are examined microscopically on all  specimens. See diagnosis for details.    Professional interpretation rendered by Thai Mcintyre D.O. at [facility], [address] [place] [address].    GROSS DESCRIPTION:  A.  Specimen is received in 1 formalin filled container \"small bowel biopsy\"  and consists of a single piece of tan soft tissue measuring 0.3 x 0.3 x 0.2  cm.  Specimen is submitted entirely in 1 cassette.  B.  Specimen is received in 1 formalin filled container \"antrum biopsy\" and  consists of a single piece of tan soft tissue measuring 0.2 x 0.1 x 0.1 cm.  Specimen submitted entirely in 1 cassette.  C.  Specimen is received in 1 formalin filled container labeled \"distal  esophagus biopsy\" and consists of a single piece of tan soft tissue  measuring 0.3 x 0.2 x 0.1 cm.  Specimen submitted entirely in 1 cassette.  D.  Specimen is received in 1 formalin filled container \"descending colon  polyp\" and consists of a single piece of tan soft tissue measuring 0.9 x 0.3  x 0.2 cm.  Specimen is bisected and submitted entirely in 1 cassette.  E.  Specimen is received in 1 formalin filled container \"colonic mucosa  biopsy\" and consists of 2                           pieces of tan soft tissue measuring 0.3 x 0.2 x 0.1  cm.  Specimen submitted entirely " "in 1 cassette.  F.  Specimen is received in 1 formalin filled container \"sigmoid colon polyp\"  and consists of a single piece of tan soft tissue measuring 0.5 x 0.3 x 0.3  cm.  Specimen submitted entirely in 1 cassette.  MM    REVIEWED, DIAGNOSED AND ELECTRONICALLY  SIGNED BY:    Thai Mcintyre D.O.  CPT CODES:  88305x6       Assessment & Plan      1. Gastroesophageal reflux disease without esophagitis    2. Irritable bowel syndrome with diarrhea    .   Discontinue Nexium start patient on Protonix 40 mg/day.  Avoid gastric irritants follow standard antireflux measures.  Trial of Xifaxan 550 mg 3 times a day for 2 weeks for irritable bowel syndrome diarrhea predominant.  Follow-up in 8 to 12 weeks for recheck return office sooner if needed    Orders placed during this encounter include:  No orders of the defined types were placed in this encounter.      * Surgery not found *    Review and/or summary of lab tests, radiology, procedures, medications. Review and summary of old records and obtaining of history. The risks and benefits of my recommendations, as well as other treatment options were discussed with the patient today. Questions were answered.    New Medications Ordered This Visit   Medications   • riFAXIMin (Xifaxan) 550 MG tablet     Sig: Take 1 tablet by mouth 3 (Three) Times a Day.     Dispense:  42 tablet     Refill:  1   • omeprazole (priLOSEC) 40 MG capsule     Sig: Take 1 capsule by mouth Daily.     Dispense:  30 capsule     Refill:  11       Follow-up: Return in about 8 weeks (around 11/22/2022) for Recheck.          This document has been electronically signed by SUSAN Weaver on September 27, 2022 13:18 CDT        I spent 19 minutes caring for Sondra on this date of service. This time includes time spent by me in the following activities:preparing for the visit, reviewing tests, obtaining and/or reviewing a separately obtained history, performing a medically appropriate examination " and/or evaluation , counseling and educating the patient/family/caregiver, ordering medications, tests, or procedures, referring and communicating with other health care professionals , documenting information in the medical record and care coordination    Results for orders placed or performed during the hospital encounter of 22   TISSUE EXAM, [facility] ([facility])    Specimen: A: Small Intestine, Duodenum; Tissue    B: Gastric, Antrum; Tissue    C: Esophagus, Distal; Tissue    D: Large Intestine, Left / Descending Colon; Polyp    E: Large Intestine; Tissue    F: Large Intestine, Sigmoid Colon; Polyp   Result Value Ref Range    Reference Lab Report       [facility]  [address] [place] [address]  Phone: [phone] or [phone]  Fax: [phone]  Donaldo Lozano M.D., Medical Director    PATIENT NAME                           LABORATORY NO.  [address]  SOULEYMANE PADILLA.                   [id number]  [id number]                         AGE              SEX  SSN           CLIENT REF #  [facility]           62      1960  F    [id number]   [id number]    [place]                       REQUESTING M.D.     ATTENDING M.D.     COPY TO[address] THOMAS       [address]  DATE COLLECTED      DATE RECEIVED      DATE REPORTED  2022    DIAGNOSIS:  A.   SMALL BOWEL, BIOPSY:  Duodenal mucosa with no diagnostic abnormality; negative for  malabsorption pattern and microorganisms.  B.   ANTRUM, BIOPSY:  Reactive antral type mucosa.  No H. pylori-like  organisms identified on H&E.  C.   ESOPHAGUS, BIOPSY, DISTAL:  Squamous epithelium with no or only rare intraepithelial eosinophils  (<5/HPF). Negative for intestinal metaplasia, dysplasia, or malignancy.  D.   DESCENDING COLON POLYP:  Tubular adenoma.  E.   COLON,  "BIOPSY:  Colonic mucosa with no diagnostic abnormality; negative for  microscopic colitis pattern.  F.   SIGMOID COLON POLYP:  Tubular adenoma.    WJB    CLINICAL HISTORY:  Gastroesophageal reflux disease with esophagitis without hemorrhage, lower  abdominal pain, change in bowel habits, diarrhea, unspecified type    SPECIMENS RECEIVED:  A.  SMALL BOWEL, BIOPSY  B.  ANTRUM, BIOPSY  C.  ESOPHAGUS, BIOPSY, DISTAL  D.  DESCENDING COLON POLYP  E.  COLON, BIOPSY  F.  SIGMOID COLON POLYP    MICROSCOPIC DESCRIPTION:  Tissue blocks are prepared and slides are examined microscopically on all  specimens. See diagnosis for details.    Professional interpretation rendered by Thai Mcintyre D.O. at [facility], [address] [place] [address].    GROSS DESCRIPTION:  A.  Specimen is received in 1 formalin filled container \"small bowel biopsy\"  and consists of a single piece of tan soft tissue measuring 0.3 x 0.3 x 0.2  cm.  Specimen is submitted entirely in 1 cassette.  B.  Specimen is received in 1 formalin filled container \"antrum biopsy\" and  consists of a single piece of tan soft tissue measuring 0.2 x 0.1 x 0.1 cm.  Specimen submitted entirely in 1 cassette.  C.  Specimen is received in 1 formalin filled container labeled \"distal  esophagus biopsy\" and consists of a single piece of tan soft tissue  measuring 0.3 x 0.2 x 0.1 cm.  Specimen submitted entirely in 1 cassette.  D.  Specimen is received in 1 formalin filled container \"descending colon  polyp\" and consists of a single piece of tan soft tissue measuring 0.9 x 0.3  x 0.2 cm.  Specimen is bisected and submitted entirely in 1 cassette.  E.  Specimen is received in 1 formalin filled container \"colonic mucosa  biopsy\" and consists of 2  pieces of tan soft tissue measuring 0.3 x 0.2 x 0.1  cm.  Specimen submitted entirely in 1 cassette.  F.  Specimen is received in 1 formalin filled container \"sigmoid colon polyp\"  and consists of a single piece of tan " soft tissue measuring 0.5 x 0.3 x 0.3  cm.  Specimen submitted entirely in 1 cassette.  MM    REVIEWED, DIAGNOSED AND ELECTRONICALLY  SIGNED BY:    Thai Mcintyre D.O.  CPT CODES:  88305x6     Results for orders placed or performed in visit on 10/13/20   COVID [facility] Priority - Swab, Nasopharynx    Specimen: Nasopharynx; Swab   Result Value Ref Range    COVID [facility] PRIORITY Comment    COVID-19,[facility] ROUTINE, NP/OP SWAB IN TRANSPORT MEDIA OR ESWAB 72 HR TAT - Swab, Nasopharynx    Specimen: Nasopharynx; Swab   Result Value Ref Range    SARS-CoV-2, TANNA Not Detected Not Detected   Results for orders placed or performed during the hospital encounter of 10/09/20   POC Glucose Once    Specimen: Blood   Result Value Ref Range    Glucose 80 70 - 130 mg/dL   Results for orders placed or performed in visit on 10/06/20   COVID [facility] Priority - Swab, Nasopharynx    Specimen: Nasopharynx; Swab   Result Value Ref Range    COVID [facility] PRIORITY Comment    COVID-19,[facility] ROUTINE, NP/OP SWAB IN TRANSPORT MEDIA OR ESWAB 72 HR TAT - Swab, Nasopharynx    Specimen: Nasopharynx; Swab   Result Value Ref Range    SARS-CoV-2, TANNA Not Detected Not Detected   Results for orders placed or performed during the hospital encounter of 11/20/19   Gold Top - SST   Result Value Ref Range    Extra Tube Hold for add-ons.    Green Top (Gel)   Result Value Ref Range    Extra Tube Hold for add-ons.    Urinalysis, Microscopic Only - Urine, Clean Catch    Specimen: Urine, Clean Catch   Result Value Ref Range    RBC, UA 0-2 (A) None Seen /HPF    WBC, UA 6-12 (A) None Seen, 0-2, 3-5 /HPF    Bacteria, UA 4+ (A) None Seen /HPF    Squamous Epithelial Cells, UA 3-5 (A) None Seen, 0-2 /HPF    Hyaline Casts, UA 7-12 None Seen /LPF    Methodology Automated Microscopy    Urinalysis With Microscopic If Indicated (No Culture) - Urine, Clean Catch    Specimen: Urine, Clean Catch   Result Value Ref Range    Color, UA Yellow Yellow, Straw, Dark Yellow,  Galina    Appearance, UA Cloudy (A) Clear    pH, UA 6.5 5.0 - 9.0    Specific Gravity, UA 1.018 1.003 - 1.030    Glucose, UA Negative Negative    Ketones, UA Negative Negative    Bilirubin, UA Negative Negative    Blood, UA Negative Negative    Protein, UA Negative Negative    Leuk Esterase, UA Moderate (2+) (A) Negative    Nitrite, UA Positive (A) Negative    Urobilinogen, UA 1.0 E.U./dL 0.2 - 1.0 E.U./dL   CBC Auto Differential    Specimen: Blood   Result Value Ref Range    WBC 8.42 3.40 - 10.80 10*3/mm3    RBC 4.34 3.77 - 5.28 10*6/mm3    Hemoglobin 12.4 12.0 - 15.9 g/dL    Hematocrit 38.9 34.0 - 46.6 %    MCV 89.6 79.0 - 97.0 fL    MCH 28.6 26.6 - 33.0 pg    MCHC 31.9 31.5 - 35.7 g/dL    RDW 12.7 12.3 - 15.4 %    RDW-SD 41.9 37.0 - 54.0 fl    MPV 9.9 6.0 - 12.0 fL    Platelets 302 140 - 450 10*3/mm3    Neutrophil % 61.4 42.7 - 76.0 %    Lymphocyte % 27.8 19.6 - 45.3 %    Monocyte % 8.1 5.0 - 12.0 %    Eosinophil % 2.0 0.3 - 6.2 %    Basophil % 0.5 0.0 - 1.5 %    Immature Grans % 0.2 0.0 - 0.5 %    Neutrophils, Absolute 5.17 1.70 - 7.00 10*3/mm3    Lymphocytes, Absolute 2.34 0.70 - 3.10 10*3/mm3    Monocytes, Absolute 0.68 0.10 - 0.90 10*3/mm3    Eosinophils, Absolute 0.17 0.00 - 0.40 10*3/mm3    Basophils, Absolute 0.04 0.00 - 0.20 10*3/mm3    Immature Grans, Absolute 0.02 0.00 - 0.05 10*3/mm3    nRBC 0.0 0.0 - 0.2 /100 WBC   Lavender Top   Result Value Ref Range    Extra Tube hold for add-on    Light Blue Top   Result Value Ref Range    Extra Tube hold for add-on    Troponin    Specimen: Blood   Result Value Ref Range    Troponin T <0.010 0.000 - 0.030 ng/mL   Lipase    Specimen: Blood   Result Value Ref Range    Lipase 36 13 - 60 U/L   Comprehensive Metabolic Panel    Specimen: Blood   Result Value Ref Range    Glucose 82 65 - 99 mg/dL    BUN 15 6 - 20 mg/dL    Creatinine 0.63 0.57 - 1.00 mg/dL    Sodium 141 136 - 145 mmol/L    Potassium 3.3 (L) 3.5 - 5.2 mmol/L    Chloride 103 98 - 107 mmol/L    CO2 28.0 22.0  - 29.0 mmol/L    Calcium 10.3 8.6 - 10.5 mg/dL    Total Protein 7.2 6.0 - 8.5 g/dL    Albumin 4.10 3.50 - 5.20 g/dL    ALT (SGPT) 15 1 - 33 U/L    AST (SGOT) 17 1 - 32 U/L    Alkaline Phosphatase 76 39 - 117 U/L    Total Bilirubin 0.3 0.2 - 1.2 mg/dL    eGFR Non African Amer 97 >60 mL/min/1.73    Globulin 3.1 gm/dL    A/G Ratio 1.3 g/dL    BUN/Creatinine Ratio 23.8 7.0 - 25.0    Anion Gap 10.0 5.0 - 15.0 mmol/L   Results for orders placed or performed in visit on 05/10/18   HPV DNA Probe, Direct - ThinPrep Vial, Vagina    Specimen: Cervix; ThinPrep Vial   Result Value Ref Range    HPV Aptima Negative Negative   Gardnerella vaginalis, Trichomonas vaginalis, Candida albicans, PCR - Swab, Vagina    Specimen: Vagina; Swab   Result Value Ref Range    CANDIDA ALBICANS Negative     GARDNERELLA VAGINALIS Negative     Trichomonas vaginalis PCR Negative    POC Pregnancy, Urine    Specimen: Urine   Result Value Ref Range    HCG, Urine, QL Negative Negative    Lot Number [id number]     Internal Positive Control Positive     Internal Negative Control Positive    Liquid-based Pap Smear, Screening    Specimen: Cervix; ThinPrep Vial   Result Value Ref Range    Case Report       Gynecologic Cytology Report                       Case: [id number]                                  Authorizing Provider:  SUSAN Mary     Collected:           05/10/2018 02:30 PM          Ordering Location[facility]     [facility]     Received:            05/11/2018 01:05 PM                                 [facility]                                                                 First Screen:          Louise Curran                                                           Rescreen:              Donaldo Nevarez MD                                                          Specimen:    Liquid-Based Pap, Screening, Cervix                                                        Interpretation Negative for intraepithelial lesion or malignancy       Other Findings Atrophy with inflammation     Specimen Adequacy       Satisfactory for evaluation, endocervical/transformation zone component present    Additional Information       Disclaimer: Cervical cytology is a screening test primarily for squamous cancer and its precursors and has associated false-negative and false-positive results.  Technologies such as liquid-based preparations may decrease but will not eliminate all false-negative results.  Follow-up of unexplained clinical signs and symptoms is recommended to minimize false-negative results. (The [place] System for Reporting Cervical Cytology: Anne, 2015).      Embedded Images     Results for orders placed or performed during the hospital encounter of 04/29/18   Urinalysis With / Culture If Indicated - Urine, Clean Catch    Specimen: Urine, Clean Catch   Result Value Ref Range    Color, UA Yellow Yellow, Straw, Dark Yellow, Gal[place]    Appearance, UA Clear Clear    pH, UA 8.0 5.0 - 9.0    Specific Gravity, UA 1.020 1.003 - 1.030    Glucose, UA Negative Negative    Ketones, UA Negative Negative    Bilirubin, UA Negative Negative    Blood, UA Negative Negative    Protein, UA Negative Negative    Leuk Esterase, UA Negative Negative    Nitrite, UA Negative Negative    Urobilinogen, UA 0.2 E.U./dL 0.2 - 1.0 E.U./dL   CBC Auto Differential    Specimen: Blood   Result Value Ref Range    WBC 6.27 3.20 - 9.80 10*3/mm3    RBC 4.52 3.77 - 5.16 10*6/mm3    Hemoglobin 13.3 12.0 - 15.5 g/dL    Hematocrit 39.7 35.0 - 45.0 %    MCV 87.8 80.0 - 98.0 fL    MCH 29.4 26.5 - 34.0 pg    MCHC 33.5 31.4 - 36.0 g/dL    RDW 12.5 11.5 - 14.5 %    RDW-SD 40.1 36.4 - 46.3 fl    MPV 9.8 8.0 - 12.0 fL    Platelets 273 150 - 450 10*3/mm3    Neutrophil % 59.6 37.0 - 80.0 %    Lymphocyte % 30.5 10.0 - 50.0 %    Monocyte % 7.8 0.0 - 12.0 %    Eosinophil % 1.6 0.0 - 7.0 %    Basophil % 0.3 0.0 - 2.0 %    Immature Grans % 0.2 0.0 - 0.5 %    Neutrophils, Absolute 3.74 2.00 - 8.60  10*3/mm3    Lymphocytes, Absolute 1.91 0.60 - 4.20 10*3/mm3    Monocytes, Absolute 0.49 0.00 - 0.90 10*3/mm3    Eosinophils, Absolute 0.10 0.00 - 0.70 10*3/mm3    Basophils, Absolute 0.02 0.00 - 0.20 10*3/mm3    Immature Grans, Absolute 0.01 0.00 - 0.02 10*3/mm3     *Note: Due to a large number of results and/or encounters for the requested time period, some results have not been displayed. A complete set of results can be found in Results Review.

## 2022-10-13 ENCOUNTER — TELEPHONE (OUTPATIENT)
Dept: GASTROENTEROLOGY | Facility: CLINIC | Age: 62
End: 2022-10-13

## 2022-10-13 NOTE — TELEPHONE ENCOUNTER
Prior Authorization was obtained on: Xifaxan 550 Mg. This was approved from: 09/30/2022-09/29/2023. [facility] was notified, this drug has a $0.00 co pay. I attempted to call Patient and did not get an answer. (Left message to return my call).  Prior Authorization Ref #: [id number]

## 2023-02-22 ENCOUNTER — OFFICE VISIT (OUTPATIENT)
Dept: GASTROENTEROLOGY | Facility: CLINIC | Age: 63
End: 2023-02-22
Payer: MEDICAID

## 2023-02-22 VITALS
DIASTOLIC BLOOD PRESSURE: 66 MMHG | SYSTOLIC BLOOD PRESSURE: 98 MMHG | HEIGHT: 61 IN | HEART RATE: 65 BPM | BODY MASS INDEX: 26.47 KG/M2 | WEIGHT: 140.2 LBS

## 2023-02-22 DIAGNOSIS — K58.0 IRRITABLE BOWEL SYNDROME WITH DIARRHEA: ICD-10-CM

## 2023-02-22 DIAGNOSIS — K21.00 GASTROESOPHAGEAL REFLUX DISEASE WITH ESOPHAGITIS WITHOUT HEMORRHAGE: Primary | ICD-10-CM

## 2023-02-22 DIAGNOSIS — R10.10 UPPER ABDOMINAL PAIN: ICD-10-CM

## 2023-02-22 PROCEDURE — 99213 OFFICE O/P EST LOW 20 MIN: CPT | Performed by: NURSE PRACTITIONER

## 2023-02-22 RX ORDER — SUCRALFATE 1 G/1
1 TABLET ORAL 4 TIMES DAILY
Qty: 120 TABLET | Refills: 2 | Status: SHIPPED | OUTPATIENT
Start: 2023-02-22

## 2023-02-22 NOTE — PROGRESS NOTES
Chief Complaint   Patient presents with   • Follow-up   • Heartburn       Subjective    Sondra Felder is a 62 y.o. female. she is here today for follow-up.                                                                  Assessment & Plan                                     1. Gastroesophageal reflux disease with esophagitis without hemorrhage    2. Irritable bowel syndrome with diarrhea    3. Upper abdominal pain      Plan; continue omeprazole daily will add Carafate before meals and bedtime encouraged low FODMAP diet avoidance of gastric irritants or any known triggers.  We will obtain ultrasound of gallbladder discussed with patient if abnormal will refer to general surgeon but if normal and still having symptoms we will plan HIDA scan patient verbalized agreement we will contact her with results when available    Follow-up: Return in about 4 weeks (around 3/22/2023) for Recheck, After test.     HPI  62-year-old female presents for follow-up regarding GERD irritable bowel and upper abdominal pain.  States Afaxin did not make significant treatment on her symptoms bowel movements are still 4-5 times per day occasionally has nocturnal stooling but reports irregular sleep pattern due to working second shift.  Denies any melena or hematochezia.  Previously underwent EGD and colonoscopy September 2022.  Repeat colonoscopy 2025 for surveillance due to adenomatous colonic polyp.  States diarrhea is yellow appearing worse after intake of fatty or greasy foods.  Denies any significant nausea      Review of Systems  Review of Systems   Constitutional: Positive for fatigue. Negative for activity change, appetite change, chills, diaphoresis, fever and unexpected weight change.   HENT: Negative for trouble swallowing.    Gastrointestinal: Positive for abdominal pain (more indigestion/full feeling ) and  "diarrhea (4-5 times per day described as pure water ). Negative for abdominal distention, anal bleeding, blood in stool, constipation, nausea, rectal pain and vomiting.       BP 98/66 (BP Location: Left arm)   Pulse 65   Ht 154.9 cm (61\")   Wt 63.6 kg (140 lb 3.2 oz)   BMI 26.49 kg/m²     Objective      Physical Exam  Constitutional:       General: She is not in acute distress.     Appearance: Normal appearance. She is normal weight. She is not ill-appearing.   HENT:      Head: Normocephalic and atraumatic.   Pulmonary:      Effort: Pulmonary effort is normal.   Abdominal:      General: Abdomen is flat. Bowel sounds are normal. There is no distension.      Palpations: Abdomen is soft. There is no mass.      Tenderness: There is abdominal tenderness in the right upper quadrant, epigastric area and left upper quadrant.   Neurological:      Mental Status: She is alert.               The following portions of the patient's history were reviewed and updated as appropriate:   Past Medical History:   Diagnosis Date   • Adenomatous polyp of colon    • Anxiety    • Carpal tunnel syndrome    • Depression    • Diabetes (HCC)    • Exanthematous disorder    • GERD (gastroesophageal reflux disease)    • Hiatal hernia    • IBS (irritable bowel syndrome)    • Osteoporosis    • Umbilical hernia    • URI (upper respiratory infection)      Past Surgical History:   Procedure Laterality Date   • CATARACT EXTRACTION W/ INTRAOCULAR LENS IMPLANT Right 10/9/2020    Procedure: CATARACT PHACO EXTRACTION WITH INTRAOCULAR LENS IMPLANT            (LATEX ALLERGY);  Surgeon: Agustin Ferguson MD;  Location: [facility];  Service: Ophthalmology;  Laterality: Right;   • CATARACT EXTRACTION W/ INTRAOCULAR LENS IMPLANT Left 10/16/2020    Procedure: REMOVE CATARACT AND IMPLANT  INTRAOCULAR LENS                 (latex allergy);  Surgeon: Agustin Ferguson MD;  Location: [facility];  Service: Ophthalmology;  Laterality: Left;   •  " SECTION     • COLONOSCOPY N/A 2022    Procedure: COLONOSCOPY;  Surgeon: Nathaniel Cruz MD;  Location: [facility] ENDOSCOPY;  Service: Gastroenterology;  Laterality: N/A;   • ENDOSCOPY  10/25/2002    Hiatal hernia, Veronica's esophagus.   • ENDOSCOPY N/A 2022    Procedure: ESOPHAGOGASTRODUODENOSCOPY;  Surgeon: Nathaniel Cruz MD;  Location: [facility] ENDOSCOPY;  Service: Gastroenterology;  Laterality: N/A;   • ENDOSCOPY AND COLONOSCOPY      Hemorrhoids   • INJECTION OF MEDICATION  2010    Kenalog (3)     • INJECTION OF MEDICATION  2009    Rocephin (1)      • SHOULDER SURGERY  2004    Impingement syndrome of the left shoulder. arthroscopic subacromial decompression, left shoulder., dr Duff   • TUBAL ABDOMINAL LIGATION       Family History   Problem Relation Age of Onset   • Colon cancer Mother    • Breast cancer Sister    • Ovarian cancer Sister    • Ovarian cancer Daughter      OB History        2    Para   2    Term   2            AB        Living           SAB        IAB        Ectopic        Molar        Multiple        Live Births                  Allergies   Allergen Reactions   • Codeine Anaphylaxis and Palpitations   • Latex Itching and Anaphylaxis     Causes skin peeling   • Flexeril [Cyclobenzaprine] Other (See Comments)     CAUSES BRADYCARDIA     Social History     Socioeconomic History   • Marital status:    Tobacco Use   • Smoking status: Never   • Smokeless tobacco: Never   Substance and Sexual Activity   • Alcohol use: No   • Drug use: No   • Sexual activity: Not Currently     Partners: Male     Current Medications:  Prior to Admission medications    Medication Sig Start Date End Date Taking? Authorizing Provider   alendronate (FOSAMAX) 70 MG tablet TAKE ONE TABLET BY MOUTH ONCE A WEEK 30 minutes BEFORE THE first food, beverage OR medicine of THE DAY with plain water 21  Yes Provider, MD Lorenzo   calcium carbonate (OS-AKSHAT) 600 MG tablet Take 600 mg  by mouth Daily.   Yes Provider, MD Lorenzo   gabapentin (NEURONTIN) 100 MG capsule Take 100 mg by mouth 3 (Three) Times a Day.   Yes Provider, MD Lorenzo   HYDROcodone-acetaminophen (NORCO)  MG per tablet Take 1 tablet by mouth.   Yes Provider, MD Lorenzo   omeprazole (priLOSEC) 40 MG capsule Take 1 capsule by mouth Daily. 9/27/22  Yes Ayanna Hernandez APRN   QUEtiapine Fumarate (SEROQUEL PO) Take  by mouth Every Night.   Yes Provider, MD Lorenzo   riFAXIMin (Xifaxan) 550 MG tablet Take 1 tablet by mouth 3 (Three) Times a Day. 9/27/22 2/22/23 Yes Ayanna Hernandez APRN     Orders placed during this encounter include:  Orders Placed This Encounter   Procedures   • US Gallbladder     Standing Status:   Future     Standing Expiration Date:   2/22/2024     Order Specific Question:   Reason for Exam:     Answer:   abd pain     Order Specific Question:   Release to patient     Answer:   Routine Release     * Surgery not found *  New Medications Ordered This Visit   Medications   • sucralfate (Carafate) 1 g tablet     Sig: Take 1 tablet by mouth 4 (Four) Times a Day.     Dispense:  120 tablet     Refill:  2         Review and/or summary of lab tests, radiology, procedures, medications. Review and summary of old records and obtaining of history. The risks and benefits of my recommendations, as well as other treatment options were discussed . Any questions/concerned were answered. Patient voiced understanding and agreement.          This document has been electronically signed by SUSAN Weaver on February 22, 2023 10:17 CST                                               Results for orders placed or performed during the hospital encounter of 09/19/22   TISSUE EXAM, [facility] ([facility])    Specimen: A: Small Intestine, Duodenum; Tissue    B: Gastric, Antrum; Tissue    C: Esophagus, Distal; Tissue    D: Large Intestine, Left / Descending Colon; Polyp    E: Large Intestine; Tissue    F: Large Intestine,  Sigmoid Colon; Polyp   Result Value Ref Range    Reference Lab Report       [facility]  [address] [place] [address]  Phone: [phone] or [phone]  Fax: [phone]  Donaldo Lozano M.D., Medical Director    PATIENT NAME                           LABORATORY NO.  [address]                   [id number]  [id number]                         AGE              SEX  SSN           CLIENT REF #  [facility]           62      1960  F    [id number]   [id number]    [place]                       REQUESTING M.D.     ATTENDING MODELL.     COPY TO[address]                 THOMAS CORBETT[address]  DATE COLLECTED      DATE RECEIVED      DATE REPORTED  2022    DIAGNOSIS:  A.   SMALL BOWEL, BIOPSY:  Duodenal mucosa with no diagnostic abnormality; negative for  malabsorption pattern and microorganisms.  B.   ANTRUM, BIOPSY:  Reactive antral type mucosa.  No H. pylori-like  organisms identified on H&E.  C.   ESOPHAGUS, BIOPSY, DISTAL:  Squamous epithelium with no or only rare intraepithelial eosinophils  (<5/HPF). Negative for intestinal metaplasia, dysplasia, or malignancy.  D.   DESCENDING COLON POLYP:  Tubular adenoma.  E.   COLON, BIOPSY:  Colonic mucosa with no diagnostic abnormality; negative for  microscopic colitis pattern.  F.   SIGMOID COLON POLYP:  Tubular adenoma.    WJB    CLINICAL HISTORY:  Gastroesophageal reflux disease with esophagitis without hemorrhage, lower  abdominal pain, change in bowel habits, diarrhea, unspecified type    SPECIMENS RECEIVED:  A.  SMALL BOWEL, BIOPSY  B.  ANTRUM, BIOPSY  C.  ESOPHAGUS, BIOPSY, DISTAL  D.  DESCENDING COLON POLYP  E.  COLON, BIOPSY  F.  SIGMOID COLON POLYP    MICROSCOPIC DESCRIPTION:  Tissue blocks are prepared and slides are examined microscopically on all  specimens. See  "diagnosis for details.    Professional interpretation rendered by Thai Mcintyre D.O. at [facility], [address] [place] [address], [place] [address].    GROSS DESCRIPTION:  A.  Specimen is received in 1 formalin filled container \"small bowel biopsy\"  and consists of a single piece of tan soft tissue measuring 0.3 x 0.3 x 0.2  cm.  Specimen is submitted entirely in 1 cassette.  B.  Specimen is received in 1 formalin filled container \"antrum biopsy\" and  consists of a single piece of tan soft tissue measuring 0.2 x 0.1 x 0.1 cm.  Specimen submitted entirely in 1 cassette.  C.  Specimen is received in 1 formalin filled container labeled \"distal  esophagus biopsy\" and consists of a single piece of tan soft tissue  measuring 0.3 x 0.2 x 0.1 cm.  Specimen submitted entirely in 1 cassette.  D.  Specimen is received in 1 formalin filled container \"descending colon  polyp\" and consists of a single piece of tan soft tissue measuring 0.9 x 0.3  x 0.2 cm.  Specimen is bisected and submitted entirely in 1 cassette.  E.  Specimen is received in 1 formalin filled container \"colonic mucosa  biopsy\" and consists of 2  pieces of tan soft tissue measuring 0.3 x 0.2 x 0.1  cm.  Specimen submitted entirely in 1 cassette.  F.  Specimen is received in 1 formalin filled container \"sigmoid colon polyp\"  and consists of a single piece of tan soft tissue measuring 0.5 x 0.3 x 0.3  cm.  Specimen submitted entirely in 1 cassette.  MM    REVIEWED, DIAGNOSED AND ELECTRONICALLY  SIGNED BY:    hTai Mcintyre D.O.  CPT CODES:  88305x6     Results for orders placed or performed in visit on 10/13/20   COVID [facility] Priority - Swab, Nasopharynx    Specimen: Nasopharynx; Swab   Result Value Ref Range    COVID [facility] PRIORITY Comment    COVID-19,[facility] ROUTINE, NP/OP SWAB IN TRANSPORT MEDIA OR ESWAB 72 HR TAT - Swab, Nasopharynx    Specimen: Nasopharynx; Swab   Result Value Ref Range    SARS-CoV-2, TANNA Not Detected Not Detected   Results for " orders placed or performed during the hospital encounter of 10/09/20   POC Glucose Once    Specimen: Blood   Result Value Ref Range    Glucose 80 70 - 130 mg/dL   Results for orders placed or performed in visit on 10/06/20   COVID [facility] Priority - Swab, Nasopharynx    Specimen: Nasopharynx; Swab   Result Value Ref Range    COVID [facility] PRIORITY Comment    COVID-19,[facility] ROUTINE, NP/OP SWAB IN TRANSPORT MEDIA OR ESWAB 72 HR TAT - Swab, Nasopharynx    Specimen: Nasopharynx; Swab   Result Value Ref Range    SARS-CoV-2, TANNA Not Detected Not Detected   Results for orders placed or performed during the hospital encounter of 11/20/19   Gold Top - SST   Result Value Ref Range    Extra Tube Hold for add-ons.    Green Top (Gel)   Result Value Ref Range    Extra Tube Hold for add-ons.    Urinalysis, Microscopic Only - Urine, Clean Catch    Specimen: Urine, Clean Catch   Result Value Ref Range    RBC, UA 0-2 (A) None Seen /HPF    WBC, UA 6-12 (A) None Seen, 0-2, 3-5 /HPF    Bacteria, UA 4+ (A) None Seen /HPF    Squamous Epithelial Cells, UA 3-5 (A) None Seen, 0-2 /HPF    Hyaline Casts, UA 7-12 None Seen /LPF    Methodology Automated Microscopy    Urinalysis With Microscopic If Indicated (No Culture) - Urine, Clean Catch    Specimen: Urine, Clean Catch   Result Value Ref Range    Color, UA Yellow Yellow, Straw, Dark Yellow, Galina    Appearance, UA Cloudy (A) Clear    pH, UA 6.5 5.0 - 9.0    Specific Gravity, UA 1.018 1.003 - 1.030    Glucose, UA Negative Negative    Ketones, UA Negative Negative    Bilirubin, UA Negative Negative    Blood, UA Negative Negative    Protein, UA Negative Negative    Leuk Esterase, UA Moderate (2+) (A) Negative    Nitrite, UA Positive (A) Negative    Urobilinogen, UA 1.0 E.U./dL 0.2 - 1.0 E.U./dL   CBC Auto Differential    Specimen: Blood   Result Value Ref Range    WBC 8.42 3.40 - 10.80 10*3/mm3    RBC 4.34 3.77 - 5.28 10*6/mm3    Hemoglobin 12.4 12.0 - 15.9 g/dL    Hematocrit 38.9 34.0 - 46.6  %    MCV 89.6 79.0 - 97.0 fL    MCH 28.6 26.6 - 33.0 pg    MCHC 31.9 31.5 - 35.7 g/dL    RDW 12.7 12.3 - 15.4 %    RDW-SD 41.9 37.0 - 54.0 fl    MPV 9.9 6.0 - 12.0 fL    Platelets 302 140 - 450 10*3/mm3    Neutrophil % 61.4 42.7 - 76.0 %    Lymphocyte % 27.8 19.6 - 45.3 %    Monocyte % 8.1 5.0 - 12.0 %    Eosinophil % 2.0 0.3 - 6.2 %    Basophil % 0.5 0.0 - 1.5 %    Immature Grans % 0.2 0.0 - 0.5 %    Neutrophils, Absolute 5.17 1.70 - 7.00 10*3/mm3    Lymphocytes, Absolute 2.34 0.70 - 3.10 10*3/mm3    Monocytes, Absolute 0.68 0.10 - 0.90 10*3/mm3    Eosinophils, Absolute 0.17 0.00 - 0.40 10*3/mm3    Basophils, Absolute 0.04 0.00 - 0.20 10*3/mm3    Immature Grans, Absolute 0.02 0.00 - 0.05 10*3/mm3    nRBC 0.0 0.0 - 0.2 /100 WBC   Lavender Top   Result Value Ref Range    Extra Tube hold for add-on    Light Blue Top   Result Value Ref Range    Extra Tube hold for add-on    Troponin    Specimen: Blood   Result Value Ref Range    Troponin T <0.010 0.000 - 0.030 ng/mL   Lipase    Specimen: Blood   Result Value Ref Range    Lipase 36 13 - 60 U/L   Comprehensive Metabolic Panel    Specimen: Blood   Result Value Ref Range    Glucose 82 65 - 99 mg/dL    BUN 15 6 - 20 mg/dL    Creatinine 0.63 0.57 - 1.00 mg/dL    Sodium 141 136 - 145 mmol/L    Potassium 3.3 (L) 3.5 - 5.2 mmol/L    Chloride 103 98 - 107 mmol/L    CO2 28.0 22.0 - 29.0 mmol/L    Calcium 10.3 8.6 - 10.5 mg/dL    Total Protein 7.2 6.0 - 8.5 g/dL    Albumin 4.10 3.50 - 5.20 g/dL    ALT (SGPT) 15 1 - 33 U/L    AST (SGOT) 17 1 - 32 U/L    Alkaline Phosphatase 76 39 - 117 U/L    Total Bilirubin 0.3 0.2 - 1.2 mg/dL    eGFR Non African Amer 97 >60 mL/min/1.73    Globulin 3.1 gm/dL    A/G Ratio 1.3 g/dL    BUN/Creatinine Ratio 23.8 7.0 - 25.0    Anion Gap 10.0 5.0 - 15.0 mmol/L   Results for orders placed or performed in visit on 05/10/18   HPV DNA Probe, Direct - ThinPrep Vial, Vagina    Specimen: Cervix; ThinPrep Vial   Result Value Ref Range    HPV Aptima Negative  Negative   Gardnerella vaginalis, Trichomonas vaginalis, Candida albicans, PCR - Swab, Vagina    Specimen: Vagina; Swab   Result Value Ref Range    CANDIDA ALBICANS Negative     GARDNERELLA VAGINALIS Negative     Trichomonas vaginalis PCR Negative    POC Pregnancy, Urine    Specimen: Urine   Result Value Ref Range    HCG, Urine, QL Negative Negative    Lot Number [id number]     Internal Positive Control Positive     Internal Negative Control Positive    Liquid-based Pap Smear, Screening    Specimen: Cervix; ThinPrep Vial   Result Value Ref Range    Case Report       Gynecologic Cytology Report                       Case: [id number]                                  Authorizing Provider:  SUSAN Mary     Collected:           05/10/2018 02:30 PM          Ordering Location:     [facility]     Received:            05/11/2018 01:05 PM                                 [facility]                                                                 First Screen:          Louise Curran                                                           Rescreen:              Donaldo Nevarez MD                                                          Specimen:    Liquid-Based Pap, Screening, Cervix                                                        Interpretation Negative for intraepithelial lesion or malignancy      Other Findings Atrophy with inflammation     Specimen Adequacy       Satisfactory for evaluation, endocervical/transformation zone component present    Additional Information       Disclaimer: Cervical cytology is a screening test primarily for squamous cancer and its precursors and has associated false-negative and false-positive results.  Technologies such as liquid-based preparations may decrease but will not eliminate all false-negative results.  Follow-up of unexplained clinical signs and symptoms is recommended to minimize false-negative results. (The [place] System for Reporting Cervical  Cytology: Anne, 2015).      Embedded Images     Results for orders placed or performed during the hospital encounter of 04/29/18   Urinalysis With / Culture If Indicated - Urine, Clean Catch    Specimen: Urine, Clean Catch   Result Value Ref Range    Color, UA Yellow Yellow, Straw, Dark Yellow, [place]    Appearance, UA Clear Clear    pH, UA 8.0 5.0 - 9.0    Specific Gravity, UA 1.020 1.003 - 1.030    Glucose, UA Negative Negative    Ketones, UA Negative Negative    Bilirubin, UA Negative Negative    Blood, UA Negative Negative    Protein, UA Negative Negative    Leuk Esterase, UA Negative Negative    Nitrite, UA Negative Negative    Urobilinogen, UA 0.2 E.U./dL 0.2 - 1.0 E.U./dL   CBC Auto Differential    Specimen: Blood   Result Value Ref Range    WBC 6.27 3.20 - 9.80 10*3/mm3    RBC 4.52 3.77 - 5.16 10*6/mm3    Hemoglobin 13.3 12.0 - 15.5 g/dL    Hematocrit 39.7 35.0 - 45.0 %    MCV 87.8 80.0 - 98.0 fL    MCH 29.4 26.5 - 34.0 pg    MCHC 33.5 31.4 - 36.0 g/dL    RDW 12.5 11.5 - 14.5 %    RDW-SD 40.1 36.4 - 46.3 fl    MPV 9.8 8.0 - 12.0 fL    Platelets 273 150 - 450 10*3/mm3    Neutrophil % 59.6 37.0 - 80.0 %    Lymphocyte % 30.5 10.0 - 50.0 %    Monocyte % 7.8 0.0 - 12.0 %    Eosinophil % 1.6 0.0 - 7.0 %    Basophil % 0.3 0.0 - 2.0 %    Immature Grans % 0.2 0.0 - 0.5 %    Neutrophils, Absolute 3.74 2.00 - 8.60 10*3/mm3    Lymphocytes, Absolute 1.91 0.60 - 4.20 10*3/mm3    Monocytes, Absolute 0.49 0.00 - 0.90 10*3/mm3    Eosinophils, Absolute 0.10 0.00 - 0.70 10*3/mm3    Basophils, Absolute 0.02 0.00 - 0.20 10*3/mm3    Immature Grans, Absolute 0.01 0.00 - 0.02 10*3/mm3     *Note: Due to a large number of results and/or encounters for the requested time period, some results have not been displayed. A complete set of results can be found in Results Review.

## 2023-03-15 ENCOUNTER — HOSPITAL ENCOUNTER (OUTPATIENT)
Dept: ULTRASOUND IMAGING | Facility: HOSPITAL | Age: 63
Discharge: HOME | End: 2023-03-15
Admitting: NURSE PRACTITIONER
Payer: MEDICAID

## 2023-03-15 DIAGNOSIS — R10.10 UPPER ABDOMINAL PAIN: ICD-10-CM

## 2023-03-15 PROCEDURE — 76705 ECHO EXAM OF ABDOMEN: CPT

## 2023-03-17 ENCOUNTER — TELEPHONE (OUTPATIENT)
Dept: GASTROENTEROLOGY | Facility: CLINIC | Age: 63
End: 2023-03-17
Payer: MEDICAID

## 2023-03-17 NOTE — TELEPHONE ENCOUNTER
Relayed results to patient , she was unsure about seeing a surgeon and wants to think about it. She does not have recent labs and wishes the orders to be faxed to [facility] in [place]         ----- Message from SUSAN Borrego sent at 3/16/2023  9:09 AM CDT -----  Please call patient with results, I can refer her to surgeon due to gallstones if she is still having abdominal pain. Her liver has more coarsened echotexture which can be due to fatty infiltration I do not have any up to date labs. Can you get them from [facility] if she has done them? If not I can order here. Liver enzymes were normal when checked in 2022.

## 2023-03-30 DIAGNOSIS — R93.5 ABNORMAL US (ULTRASOUND) OF ABDOMEN: Primary | ICD-10-CM

## 2023-05-22 ENCOUNTER — LAB (OUTPATIENT)
Dept: LAB | Facility: HOSPITAL | Age: 63
End: 2023-05-22
Payer: MEDICAID

## 2023-05-22 ENCOUNTER — OFFICE VISIT (OUTPATIENT)
Dept: GASTROENTEROLOGY | Facility: CLINIC | Age: 63
End: 2023-05-22
Payer: MEDICAID

## 2023-05-22 VITALS
HEART RATE: 65 BPM | BODY MASS INDEX: 26.09 KG/M2 | WEIGHT: 138.2 LBS | HEIGHT: 61 IN | SYSTOLIC BLOOD PRESSURE: 102 MMHG | DIASTOLIC BLOOD PRESSURE: 62 MMHG

## 2023-05-22 DIAGNOSIS — K80.20 CALCULUS OF GALLBLADDER WITHOUT CHOLECYSTITIS WITHOUT OBSTRUCTION: Primary | ICD-10-CM

## 2023-05-22 DIAGNOSIS — R93.5 ABNORMAL US (ULTRASOUND) OF ABDOMEN: ICD-10-CM

## 2023-05-22 DIAGNOSIS — K21.00 GASTROESOPHAGEAL REFLUX DISEASE WITH ESOPHAGITIS WITHOUT HEMORRHAGE: ICD-10-CM

## 2023-05-22 LAB
ALBUMIN SERPL-MCNC: 4.3 G/DL (ref 3.5–5.2)
ALBUMIN/GLOB SERPL: 1.4 G/DL
ALP SERPL-CCNC: 92 U/L (ref 39–117)
ALPHA-FETOPROTEIN: 2.56 NG/ML (ref 0–8.3)
ALT SERPL W P-5'-P-CCNC: 19 U/L (ref 1–33)
ANION GAP SERPL CALCULATED.3IONS-SCNC: 10.6 MMOL/L (ref 5–15)
AST SERPL-CCNC: 19 U/L (ref 1–32)
BILIRUB SERPL-MCNC: 0.5 MG/DL (ref 0–1.2)
BUN SERPL-MCNC: 10 MG/DL (ref 8–23)
BUN/CREAT SERPL: 12.7 (ref 7–25)
CALCIUM SPEC-SCNC: 10.9 MG/DL (ref 8.6–10.5)
CHLORIDE SERPL-SCNC: 103 MMOL/L (ref 98–107)
CO2 SERPL-SCNC: 26.4 MMOL/L (ref 22–29)
CREAT SERPL-MCNC: 0.79 MG/DL (ref 0.57–1)
DEPRECATED RDW RBC AUTO: 41.6 FL (ref 37–54)
EGFRCR SERPLBLD CKD-EPI 2021: 85.2 ML/MIN/1.73
ERYTHROCYTE [DISTWIDTH] IN BLOOD BY AUTOMATED COUNT: 12.7 % (ref 12.3–15.4)
GLOBULIN UR ELPH-MCNC: 3 GM/DL
GLUCOSE SERPL-MCNC: 94 MG/DL (ref 65–99)
HAV IGM SERPL QL IA: NORMAL
HBV CORE IGM SERPL QL IA: NORMAL
HBV SURFACE AG SERPL QL IA: NORMAL
HCT VFR BLD AUTO: 38.5 % (ref 34–46.6)
HCV AB SER DONR QL: NORMAL
HGB BLD-MCNC: 12.8 G/DL (ref 12–15.9)
INR PPP: 1.01 (ref 0.8–1.2)
MCH RBC QN AUTO: 29.6 PG (ref 26.6–33)
MCHC RBC AUTO-ENTMCNC: 33.2 G/DL (ref 31.5–35.7)
MCV RBC AUTO: 89.1 FL (ref 79–97)
PLATELET # BLD AUTO: 357 10*3/MM3 (ref 140–450)
PMV BLD AUTO: 10.3 FL (ref 6–12)
POTASSIUM SERPL-SCNC: 4.4 MMOL/L (ref 3.5–5.2)
PROT SERPL-MCNC: 7.3 G/DL (ref 6–8.5)
PROTHROMBIN TIME: 13.3 SECONDS (ref 11.1–15.3)
RBC # BLD AUTO: 4.32 10*6/MM3 (ref 3.77–5.28)
SODIUM SERPL-SCNC: 140 MMOL/L (ref 136–145)
WBC NRBC COR # BLD: 8.66 10*3/MM3 (ref 3.4–10.8)

## 2023-05-22 PROCEDURE — 85610 PROTHROMBIN TIME: CPT

## 2023-05-22 PROCEDURE — 82105 ALPHA-FETOPROTEIN SERUM: CPT

## 2023-05-22 PROCEDURE — 80074 ACUTE HEPATITIS PANEL: CPT

## 2023-05-22 PROCEDURE — 99213 OFFICE O/P EST LOW 20 MIN: CPT | Performed by: NURSE PRACTITIONER

## 2023-05-22 PROCEDURE — 85027 COMPLETE CBC AUTOMATED: CPT

## 2023-05-22 PROCEDURE — 80053 COMPREHEN METABOLIC PANEL: CPT

## 2023-05-22 PROCEDURE — 1160F RVW MEDS BY RX/DR IN RCRD: CPT | Performed by: NURSE PRACTITIONER

## 2023-05-22 PROCEDURE — 3074F SYST BP LT 130 MM HG: CPT | Performed by: NURSE PRACTITIONER

## 2023-05-22 PROCEDURE — 3078F DIAST BP <80 MM HG: CPT | Performed by: NURSE PRACTITIONER

## 2023-05-22 PROCEDURE — 1159F MED LIST DOCD IN RCRD: CPT | Performed by: NURSE PRACTITIONER

## 2023-05-22 RX ORDER — POTASSIUM CHLORIDE 600 MG/1
8 TABLET, FILM COATED, EXTENDED RELEASE ORAL 2 TIMES DAILY
COMMUNITY

## 2023-05-22 NOTE — PROGRESS NOTES
Chief Complaint   Patient presents with   • Heartburn       Subjective    Sondra Felder is a 62 y.o. female. she is here today for follow-up.                                                                  Assessment & Plan                                     1. Calculus of gallbladder without cholecystitis without obstruction    2. Gastroesophageal reflux disease with esophagitis without hemorrhage      Plan; continue omeprazole daily Carafate as needed.  Discussed asymptomatic gallstones if symptoms occur we will plan referral to surgeon however no nausea vomiting abdominal pain at this time we will continue with clinical surveillance.  We will check lab work due to abnormal liver noted on ultrasound.    Follow-up: Return in about 4 weeks (around 6/19/2023).     HPI  62-year-old female presents for follow-up after ultrasound 3/15/2023.  Reports reflux has been better with PPI daily. States bowel habits are back to normal about 1 time per day. Had food poisoning a week before and entire group with nausea/vomiting/diarrhea that improved after 24-48 hours.  Denies any GI complaints at this time.  Ultrasound noted somewhat coarsened heterogenous echotexture of liver suggesting hepatocellular disease cholelithiasis minimal adenomyomatosis of the gallbladder  Review of Systems  Review of Systems   Constitutional: Negative for activity change, appetite change, chills, diaphoresis, fatigue, fever and unexpected weight change.   HENT: Negative for sore throat and trouble swallowing.    Respiratory: Negative for shortness of breath.    Gastrointestinal: Negative for abdominal distention, abdominal pain, anal bleeding, blood in stool, constipation, diarrhea, nausea, rectal pain and vomiting.   Musculoskeletal: Negative for arthralgias.   Skin: Negative for pallor.   Neurological: Negative for  "light-headedness.       /62 (BP Location: Left arm)   Pulse 65   Ht 154.9 cm (61\")   Wt 62.7 kg (138 lb 3.2 oz)   BMI 26.11 kg/m²     Objective      Physical Exam  Constitutional:       General: She is not in acute distress.     Appearance: Normal appearance. She is normal weight. She is not ill-appearing or toxic-appearing.   HENT:      Head: Normocephalic and atraumatic.   Pulmonary:      Effort: Pulmonary effort is normal.   Abdominal:      General: Abdomen is flat. Bowel sounds are normal. There is no distension.      Palpations: Abdomen is soft. There is no mass.      Tenderness: There is no abdominal tenderness.   Neurological:      Mental Status: She is alert.               The following portions of the patient's history were reviewed and updated as appropriate:   Past Medical History:   Diagnosis Date   • Adenomatous polyp of colon    • Anxiety    • Carpal tunnel syndrome    • Depression    • Diabetes    • Exanthematous disorder    • GERD (gastroesophageal reflux disease)    • Hiatal hernia    • IBS (irritable bowel syndrome)    • Osteoporosis    • Umbilical hernia    • URI (upper respiratory infection)      Past Surgical History:   Procedure Laterality Date   • CATARACT EXTRACTION W/ INTRAOCULAR LENS IMPLANT Right 10/9/2020    Procedure: CATARACT PHACO EXTRACTION WITH INTRAOCULAR LENS IMPLANT            (LATEX ALLERGY);  Surgeon: Agustin Ferguson MD;  Location: [facility] OR;  Service: Ophthalmology;  Laterality: Right;   • CATARACT EXTRACTION W/ INTRAOCULAR LENS IMPLANT Left 10/16/2020    Procedure: REMOVE CATARACT AND IMPLANT  INTRAOCULAR LENS                 (latex allergy);  Surgeon: Agustin Ferguson MD;  Location: [facility] OR;  Service: Ophthalmology;  Laterality: Left;   •  SECTION     • COLONOSCOPY N/A 2022    Procedure: COLONOSCOPY;  Surgeon: Nathaniel Cruz MD;  Location: [facility] ENDOSCOPY;  Service: Gastroenterology;  Laterality: N/A;   • ENDOSCOPY  10/25/2002    " Hiatal hernia, Veronica's esophagus.   • ENDOSCOPY N/A 2022    Procedure: ESOPHAGOGASTRODUODENOSCOPY;  Surgeon: Nathaniel Cruz MD;  Location: [facility] ENDOSCOPY;  Service: Gastroenterology;  Laterality: N/A;   • ENDOSCOPY AND COLONOSCOPY      Hemorrhoids   • INJECTION OF MEDICATION  2010    Kenalog (3)     • INJECTION OF MEDICATION  2009    Rocephin (1)      • SHOULDER SURGERY  2004    Impingement syndrome of the left shoulder. arthroscopic subacromial decompression, left shoulder., dr Duff   • TUBAL ABDOMINAL LIGATION       Family History   Problem Relation Age of Onset   • Colon cancer Mother    • Breast cancer Sister    • Ovarian cancer Sister    • Ovarian cancer Daughter      OB History        2    Para   2    Term   2            AB        Living           SAB        IAB        Ectopic        Molar        Multiple        Live Births                  Allergies   Allergen Reactions   • Codeine Anaphylaxis and Palpitations   • Latex Itching and Anaphylaxis     Causes skin peeling   • Flexeril [Cyclobenzaprine] Other (See Comments)     CAUSES BRADYCARDIA     Social History     Socioeconomic History   • Marital status:    Tobacco Use   • Smoking status: Never   • Smokeless tobacco: Never   Substance and Sexual Activity   • Alcohol use: No   • Drug use: No   • Sexual activity: Not Currently     Partners: Male     Current Medications:  Prior to Admission medications    Medication Sig Start Date End Date Taking? Authorizing Provider   gabapentin (NEURONTIN) 100 MG capsule Take 1 capsule by mouth 3 (Three) Times a Day.   Yes Lorenzo Edgar MD   HYDROcodone-acetaminophen (NORCO)  MG per tablet Take 1 tablet by mouth.   Yes Lorenzo Edgar MD   omeprazole (priLOSEC) 40 MG capsule Take 1 capsule by mouth Daily. 22  Yes Ayanna Hernandez APRN   potassium chloride (KLOR-CON) 8 MEQ CR tablet Take 1 tablet by mouth 2 (Two) Times a Day.   Yes ProviderLorenzo MD    sucralfate (Carafate) 1 g tablet Take 1 tablet by mouth 4 (Four) Times a Day. 2/22/23  Yes Ayanna Hernandez APRN   alendronate (FOSAMAX) 70 MG tablet TAKE ONE TABLET BY MOUTH ONCE A WEEK 30 minutes BEFORE THE first food, beverage OR medicine of THE DAY with plain water  Patient not taking: Reported on 5/22/2023 2/24/21   Lorenzo Edgar MD   calcium carbonate (OS-AKSHAT) 600 MG tablet Take 600 mg by mouth Daily.  Patient not taking: Reported on 5/22/2023    Lorenzo Edgar MD   QUEtiapine Fumarate (SEROQUEL PO) Take  by mouth Every Night.  Patient not taking: Reported on 5/22/2023    Lorenzo Edgar MD     Orders placed during this encounter include:  No orders of the defined types were placed in this encounter.    * Surgery not found *  No orders of the defined types were placed in this encounter.        Review and/or summary of lab tests, radiology, procedures, medications. Review and summary of old records and obtaining of history. The risks and benefits of my recommendations, as well as other treatment options were discussed . Any questions/concerned were answered. Patient voiced understanding and agreement.          This document has been electronically signed by SUSAN Weaver on May 23, 2023 15:26 CDT                                               Results for orders placed or performed in visit on 05/22/23   AFP Tumor Marker    Specimen: Blood   Result Value Ref Range    ALPHA-FETOPROTEIN 2.56 0 - 8.3 ng/mL   Hepatitis Panel, Acute    Specimen: Blood   Result Value Ref Range    Hepatitis B Surface Ag Non-Reactive Non-Reactive    Hep A IgM Non-Reactive Non-Reactive    Hep B C IgM Non-Reactive Non-Reactive    Hepatitis C Ab Non-Reactive Non-Reactive   Protime-INR    Specimen: Blood   Result Value Ref Range    Protime 13.3 11.1 - 15.3 Seconds    INR 1.01 0.80 - 1.20   CBC (No Diff)    Specimen: Blood   Result Value Ref Range    WBC 8.66 3.40 - 10.80 10*3/mm3    RBC 4.32 3.77 - 5.28 10*6/mm3     Hemoglobin 12.8 12.0 - 15.9 g/dL    Hematocrit 38.5 34.0 - 46.6 %    MCV 89.1 79.0 - 97.0 fL    MCH 29.6 26.6 - 33.0 pg    MCHC 33.2 31.5 - 35.7 g/dL    RDW 12.7 12.3 - 15.4 %    RDW-SD 41.6 37.0 - 54.0 fl    MPV 10.3 6.0 - 12.0 fL    Platelets 357 140 - 450 10*3/mm3   Comprehensive Metabolic Panel    Specimen: Blood   Result Value Ref Range    Glucose 94 65 - 99 mg/dL    BUN 10 8 - 23 mg/dL    Creatinine 0.79 0.57 - 1.00 mg/dL    Sodium 140 136 - 145 mmol/L    Potassium 4.4 3.5 - 5.2 mmol/L    Chloride 103 98 - 107 mmol/L    CO2 26.4 22.0 - 29.0 mmol/L    Calcium 10.9 (H) 8.6 - 10.5 mg/dL    Total Protein 7.3 6.0 - 8.5 g/dL    Albumin 4.3 3.5 - 5.2 g/dL    ALT (SGPT) 19 1 - 33 U/L    AST (SGOT) 19 1 - 32 U/L    Alkaline Phosphatase 92 39 - 117 U/L    Total Bilirubin 0.5 0.0 - 1.2 mg/dL    Globulin 3.0 gm/dL    A/G Ratio 1.4 g/dL    BUN/Creatinine Ratio 12.7 7.0 - 25.0    Anion Gap 10.6 5.0 - 15.0 mmol/L    eGFR 85.2 >60.0 mL/min/1.73   Results for orders placed or performed during the hospital encounter of 22   TISSUE EXAM, [facility] ([facility])    Specimen: A: Small Intestine, Duodenum; Tissue    B: Gastric, Antrum; Tissue    C: Esophagus, Distal; Tissue    D: Large Intestine, Left / Descending Colon; Polyp    E: Large Intestine; Tissue    F: Large Intestine, Sigmoid Colon; Polyp   Result Value Ref Range    Reference Lab Report       [facility]  [address] [place] [address]  Phone: [phone] or [phone]  Fax: [phone]  Donaldo Lozano M.D., Medical Director    PATIENT NAME                           LABORATORY [address]                   [id number]  [id number]                         AGE              SEX  N           CLIENT REF #  [facility]           62      1960  F    [id number]   [id number]    [place]                       REQUESTING M.D.     ATTENDING MPhilipD.     COPY TO[address]                 [place] [address]                                 [place]  DATE COLLECTED      DATE RECEIVED      DATE REPORTED  09/19/2022 09/20/2022 09/21/2022    DIAGNOSIS:  A.   SMALL BOWEL, BIOPSY:  Duodenal mucosa with no diagnostic abnormality; negative for  malabsorption pattern and microorganisms.  B.   ANTRUM, BIOPSY:  Reactive antral type mucosa.  No H. pylori-like  organisms identified on H&E.  C.   ESOPHAGUS, BIOPSY, DISTAL:  Squamous epithelium with no or only rare intraepithelial eosinophils  (<5/HPF). Negative for intestinal metaplasia, dysplasia, or malignancy.  D.   DESCENDING COLON POLYP:  Tubular adenoma.  E.   COLON, BIOPSY:  Colonic mucosa with no diagnostic abnormality; negative for  microscopic colitis pattern.  F.   SIGMOID COLON POLYP:  Tubular adenoma.    WJB    CLINICAL HISTORY:  Gastroesophageal reflux disease with esophagitis without hemorrhage, lower  abdominal pain, change in bowel habits, diarrhea, unspecified type    SPECIMENS RECEIVED:  A.  SMALL BOWEL, BIOPSY  B.  ANTRUM, BIOPSY  C.  ESOPHAGUS, BIOPSY, DISTAL  D.  DESCENDING COLON POLYP  E.  COLON, BIOPSY  F.  SIGMOID COLON POLYP    MICROSCOPIC DESCRIPTION:  Tissue blocks are prepared and slides are examined microscopically on all  specimens. See diagnosis for details.    Professional interpretation rendered by Thai Mcintyre D.O. at [employer],  [facility], [address] [place] [address].    GROSS DESCRIPTION:  A.  Specimen is received in 1 formalin filled container \"small bowel biopsy\"  and consists of a single piece of tan soft tissue measuring 0.3 x 0.3 x 0.2  cm.  Specimen is submitted entirely in 1 cassette.  B.  Specimen is received in 1 formalin filled container \"antrum biopsy\" and  consists of a single piece of tan soft tissue measuring 0.2 x 0.1 x 0.1 cm.  Specimen submitted entirely in 1 cassette.  C.  Specimen is received in 1 formalin filled container labeled " "\"distal  esophagus biopsy\" and consists of a single piece of tan soft tissue  measuring 0.3 x 0.2 x 0.1 cm.  Specimen submitted entirely in 1 cassette.  D.  Specimen is received in 1 formalin filled container \"descending colon  polyp\" and consists of a single piece of tan soft tissue measuring 0.9 x 0.3  x 0.2 cm.  Specimen is bisected and submitted entirely in 1 cassette.  E.  Specimen is received in 1 formalin filled container \"colonic mucosa  biopsy\" and consists of 2  pieces of tan soft tissue measuring 0.3 x 0.2 x 0.1  cm.  Specimen submitted entirely in 1 cassette.  F.  Specimen is received in 1 formalin filled container \"sigmoid colon polyp\"  and consists of a single piece of tan soft tissue measuring 0.5 x 0.3 x 0.3  cm.  Specimen submitted entirely in 1 cassette.  MM    REVIEWED, DIAGNOSED AND ELECTRONICALLY  SIGNED BY:    Thai Mcintyre D.O.  CPT CODES:  88305x6     Results for orders placed or performed in visit on 10/13/20   COVID [facility] Priority - Swab, Nasopharynx    Specimen: Nasopharynx; Swab   Result Value Ref Range    COVID [facility] PRIORITY Comment    COVID-19,[facility] ROUTINE, NP/OP SWAB IN TRANSPORT MEDIA OR ESWAB 72 HR TAT - Swab, Nasopharynx    Specimen: Nasopharynx; Swab   Result Value Ref Range    SARS-CoV-2, TANNA Not Detected Not Detected   Results for orders placed or performed during the hospital encounter of 10/09/20   POC Glucose Once    Specimen: Blood   Result Value Ref Range    Glucose 80 70 - 130 mg/dL   Results for orders placed or performed in visit on 10/06/20   COVID [facility] Priority - Swab, Nasopharynx    Specimen: Nasopharynx; Swab   Result Value Ref Range    COVID [facility] PRIORITY Comment    COVID-19,[facility] ROUTINE, NP/OP SWAB IN TRANSPORT MEDIA OR ESWAB 72 HR TAT - Swab, Nasopharynx    Specimen: Nasopharynx; Swab   Result Value Ref Range    SARS-CoV-2, TANNA Not Detected Not Detected   Results for orders placed or performed during the hospital encounter of 11/20/19 "   Gold Top - SST   Result Value Ref Range    Extra Tube Hold for add-ons.    Green Top (Gel)   Result Value Ref Range    Extra Tube Hold for add-ons.    Urinalysis, Microscopic Only - Urine, Clean Catch    Specimen: Urine, Clean Catch   Result Value Ref Range    RBC, UA 0-2 (A) None Seen /HPF    WBC, UA 6-12 (A) None Seen, 0-2, 3-5 /HPF    Bacteria, UA 4+ (A) None Seen /HPF    Squamous Epithelial Cells, UA 3-5 (A) None Seen, 0-2 /HPF    Hyaline Casts, UA 7-12 None Seen /LPF    Methodology Automated Microscopy    Urinalysis With Microscopic If Indicated (No Culture) - Urine, Clean Catch    Specimen: Urine, Clean Catch   Result Value Ref Range    Color, UA Yellow Yellow, Straw, Dark Yellow, [place]    Appearance, UA Cloudy (A) Clear    pH, UA 6.5 5.0 - 9.0    Specific Gravity, UA 1.018 1.003 - 1.030    Glucose, UA Negative Negative    Ketones, UA Negative Negative    Bilirubin, UA Negative Negative    Blood, UA Negative Negative    Protein, UA Negative Negative    Leuk Esterase, UA Moderate (2+) (A) Negative    Nitrite, UA Positive (A) Negative    Urobilinogen, UA 1.0 E.U./dL 0.2 - 1.0 E.U./dL   CBC Auto Differential    Specimen: Blood   Result Value Ref Range    WBC 8.42 3.40 - 10.80 10*3/mm3    RBC 4.34 3.77 - 5.28 10*6/mm3    Hemoglobin 12.4 12.0 - 15.9 g/dL    Hematocrit 38.9 34.0 - 46.6 %    MCV 89.6 79.0 - 97.0 fL    MCH 28.6 26.6 - 33.0 pg    MCHC 31.9 31.5 - 35.7 g/dL    RDW 12.7 12.3 - 15.4 %    RDW-SD 41.9 37.0 - 54.0 fl    MPV 9.9 6.0 - 12.0 fL    Platelets 302 140 - 450 10*3/mm3    Neutrophil % 61.4 42.7 - 76.0 %    Lymphocyte % 27.8 19.6 - 45.3 %    Monocyte % 8.1 5.0 - 12.0 %    Eosinophil % 2.0 0.3 - 6.2 %    Basophil % 0.5 0.0 - 1.5 %    Immature Grans % 0.2 0.0 - 0.5 %    Neutrophils, Absolute 5.17 1.70 - 7.00 10*3/mm3    Lymphocytes, Absolute 2.34 0.70 - 3.10 10*3/mm3    Monocytes, Absolute 0.68 0.10 - 0.90 10*3/mm3    Eosinophils, Absolute 0.17 0.00 - 0.40 10*3/mm3    Basophils, Absolute 0.04 0.00 -  0.20 10*3/mm3    Immature Grans, Absolute 0.02 0.00 - 0.05 10*3/mm3    nRBC 0.0 0.0 - 0.2 /100 WBC   Lavender Top   Result Value Ref Range    Extra Tube hold for add-on    Light Blue Top   Result Value Ref Range    Extra Tube hold for add-on    Troponin    Specimen: Blood   Result Value Ref Range    Troponin T <0.010 0.000 - 0.030 ng/mL   Lipase    Specimen: Blood   Result Value Ref Range    Lipase 36 13 - 60 U/L   Comprehensive Metabolic Panel    Specimen: Blood   Result Value Ref Range    Glucose 82 65 - 99 mg/dL    BUN 15 6 - 20 mg/dL    Creatinine 0.63 0.57 - 1.00 mg/dL    Sodium 141 136 - 145 mmol/L    Potassium 3.3 (L) 3.5 - 5.2 mmol/L    Chloride 103 98 - 107 mmol/L    CO2 28.0 22.0 - 29.0 mmol/L    Calcium 10.3 8.6 - 10.5 mg/dL    Total Protein 7.2 6.0 - 8.5 g/dL    Albumin 4.10 3.50 - 5.20 g/dL    ALT (SGPT) 15 1 - 33 U/L    AST (SGOT) 17 1 - 32 U/L    Alkaline Phosphatase 76 39 - 117 U/L    Total Bilirubin 0.3 0.2 - 1.2 mg/dL    eGFR Non African Amer 97 >60 mL/min/1.73    Globulin 3.1 gm/dL    A/G Ratio 1.3 g/dL    BUN/Creatinine Ratio 23.8 7.0 - 25.0    Anion Gap 10.0 5.0 - 15.0 mmol/L     *Note: Due to a large number of results and/or encounters for the requested time period, some results have not been displayed. A complete set of results can be found in Results Review.

## 2023-05-30 ENCOUNTER — TELEPHONE (OUTPATIENT)
Dept: GASTROENTEROLOGY | Facility: CLINIC | Age: 63
End: 2023-05-30

## 2023-05-30 NOTE — TELEPHONE ENCOUNTER
Lab results left on patients self identifying vociemail        ----- Message from SUSAN Borrego sent at 5/26/2023  3:40 PM CDT -----  Please call patient with results. Labs reassuring negative for hepatitis, anemia and liver function appears normal. Follow up as planned

## 2023-07-27 ENCOUNTER — OFFICE VISIT (OUTPATIENT)
Dept: ORTHOPEDIC SURGERY | Facility: CLINIC | Age: 63
End: 2023-07-27
Payer: MEDICAID

## 2023-07-27 VITALS — WEIGHT: 134 LBS | BODY MASS INDEX: 25.3 KG/M2 | HEIGHT: 61 IN

## 2023-07-27 DIAGNOSIS — G89.29 CHRONIC LEFT SHOULDER PAIN: Primary | ICD-10-CM

## 2023-07-27 DIAGNOSIS — M19.019 AC JOINT ARTHROPATHY: ICD-10-CM

## 2023-07-27 DIAGNOSIS — M24.812 INTERNAL DERANGEMENT OF LEFT SHOULDER: ICD-10-CM

## 2023-07-27 DIAGNOSIS — M25.512 CHRONIC LEFT SHOULDER PAIN: Primary | ICD-10-CM

## 2023-07-27 NOTE — PROGRESS NOTES
Sondra Felder is a 63 y.o. female   Primary provider:  Noé June APRN       Chief Complaint   Patient presents with    Left Shoulder - Pain       HISTORY OF PRESENT ILLNESS:    Mrs. Felder is a 63-year-old female who presents today with complaints of chronic left shoulder pain.  She has history of chronic left shoulder pain for which she has been seen by Dr. Galdamez and Dr. Sherin reed.  She reports a shoulder surgery in 2003 but she is unsure what was done.  She reports pain worsened on July 4, 2023 after injuring her shoulder.  She describes pain as severe and constant.  Pain is aching and burning.  Pain is associated with popping and swelling.  She has tried intra-articular injections, NSAIDs, activity modification, and RICE therapy without relief of symptoms.  Patient checked numbness and tingling on intake form, but does not report any of this currently.  She had recent x-rays performed at an urgent care center which did not show acute bony abnormality.    Pain  This is a new problem. The current episode started 1 to 4 weeks ago. The problem occurs constantly. Progression since onset: aching, burning swelling, popping. Associated symptoms include numbness. Exacerbated by: driving. She has tried nothing for the symptoms. The treatment provided mild relief.      CONCURRENT MEDICAL HISTORY:    Past Medical History:   Diagnosis Date    Adenomatous polyp of colon     Anxiety     Carpal tunnel syndrome     Depression     Diabetes     Exanthematous disorder     GERD (gastroesophageal reflux disease)     Hiatal hernia     IBS (irritable bowel syndrome)     Osteoporosis     Umbilical hernia     URI (upper respiratory infection)        Allergies   Allergen Reactions    Codeine Anaphylaxis and Palpitations    Latex Itching and Anaphylaxis     Causes skin peeling    Flexeril [Cyclobenzaprine] Other (See Comments)     CAUSES BRADYCARDIA         Current Outpatient Medications:     alendronate (FOSAMAX) 70 MG  tablet, , Disp: , Rfl:     calcium carbonate (OS-AKSHAT) 600 MG tablet, Take 1 tablet by mouth Daily., Disp: , Rfl:     Estrogens Conjugated (Premarin) 0.625 MG/GM vaginal cream, Insert 0.5 grams vaginally at bedtime 2 nights per week., Disp: 30 g, Rfl: 6    gabapentin (NEURONTIN) 100 MG capsule, Take 1 capsule by mouth 3 (Three) Times a Day., Disp: , Rfl:     HYDROcodone-acetaminophen (NORCO)  MG per tablet, Take 1 tablet by mouth., Disp: , Rfl:     omeprazole (priLOSEC) 40 MG capsule, Take 1 capsule by mouth Daily., Disp: 30 capsule, Rfl: 11    PARoxetine (PAXIL) 20 MG tablet, Take 1 tablet by mouth Daily., Disp: , Rfl:     potassium chloride (KLOR-CON) 8 MEQ CR tablet, Take 1 tablet by mouth 2 (Two) Times a Day., Disp: , Rfl:     QUEtiapine Fumarate (SEROQUEL PO), Take  by mouth Every Night., Disp: , Rfl:     sucralfate (Carafate) 1 g tablet, Take 1 tablet by mouth 4 (Four) Times a Day., Disp: 120 tablet, Rfl: 2    Past Surgical History:   Procedure Laterality Date    CATARACT EXTRACTION W/ INTRAOCULAR LENS IMPLANT Right 10/9/2020    Procedure: CATARACT PHACO EXTRACTION WITH INTRAOCULAR LENS IMPLANT            (LATEX ALLERGY);  Surgeon: Agustin Ferguson MD;  Location: [facility] OR;  Service: Ophthalmology;  Laterality: Right;    CATARACT EXTRACTION W/ INTRAOCULAR LENS IMPLANT Left 10/16/2020    Procedure: REMOVE CATARACT AND IMPLANT  INTRAOCULAR LENS                 (latex allergy);  Surgeon: Agustin Ferguson MD;  Location: [facility] OR;  Service: Ophthalmology;  Laterality: Left;     SECTION      COLONOSCOPY N/A 2022    Procedure: COLONOSCOPY;  Surgeon: Nathaniel Cruz MD;  Location: [facility] ENDOSCOPY;  Service: Gastroenterology;  Laterality: N/A;    ENDOSCOPY  10/25/2002    Hiatal hernia, Veronica's esophagus.    ENDOSCOPY N/A 2022    Procedure: ESOPHAGOGASTRODUODENOSCOPY;  Surgeon: Nathaniel Cruz MD;  Location: [facility] ENDOSCOPY;  Service: Gastroenterology;  Laterality: N/A;     "ENDOSCOPY AND COLONOSCOPY      Hemorrhoids    INJECTION OF MEDICATION  06/28/2010    Kenalog (3)      INJECTION OF MEDICATION  08/12/2009    Rocephin (1)       SHOULDER SURGERY  01/12/2004    Impingement syndrome of the left shoulder. arthroscopic subacromial decompression, left shoulder., dr Duff    TUBAL ABDOMINAL LIGATION         Family History   Problem Relation Age of Onset    Colon cancer Mother     Breast cancer Sister     Ovarian cancer Sister     Ovarian cancer Daughter         Social History     Socioeconomic History    Marital status:    Tobacco Use    Smoking status: Never    Smokeless tobacco: Never   Substance and Sexual Activity    Alcohol use: No    Drug use: No    Sexual activity: Not Currently     Partners: Male        Review of Systems   Cardiovascular:  Positive for palpitations.   Neurological:  Positive for numbness.   Hematological:  Bruises/bleeds easily.   Psychiatric/Behavioral:  The patient is nervous/anxious.      PHYSICAL EXAMINATION:       Ht 154.9 cm (61\")   Wt 60.8 kg (134 lb)   BMI 25.32 kg/m²     Physical Exam  Vitals and nursing note reviewed.   Constitutional:       General: She is not in acute distress.     Appearance: She is well-developed. She is not toxic-appearing.   HENT:      Head: Normocephalic.   Pulmonary:      Effort: Pulmonary effort is normal. No respiratory distress.   Skin:     General: Skin is warm and dry.   Neurological:      Mental Status: She is alert and oriented to person, place, and time.   Psychiatric:         Behavior: Behavior normal.         Thought Content: Thought content normal.         Judgment: Judgment normal.       GAIT:     []  Normal  []  Antalgic    Assistive device: []  None  []  Walker     []  Crutches  []  Cane     []  Wheelchair  []  Stretcher    Left Shoulder Exam     Range of Motion   Active abduction:  100   Extension:  30   External rotation:  70   Forward flexion:  130   Internal rotation 90 degrees:  80     Tests   Belcher " test: positive  Impingement: positive     Comments:  Positive full can test.  Positive empty can test              No results found.        ASSESSMENT:    Diagnoses and all orders for this visit:    Chronic left shoulder pain  -     MRI shoulder left wo contrast; Future    Internal derangement of left shoulder  -     MRI shoulder left wo contrast; Future    AC joint arthropathy  -     MRI shoulder left wo contrast; Future          PLAN    X-rays reviewed, no acute bony abnormality identified.  Patient reports chronic left shoulder pain throughout the past few years, worsened by recent injury.  Range of motion continues to worsen, pain continues to increase.  She has tried conservative options in the past such as subacromial injections, activity modification, RICE therapy, and various NSAIDs without relief of symptoms.  Patient is interested in potential surgical management.  MRI ordered, patient to follow-up with surgeon after MRI to review results.    Time spent of a minimum of 45 minutes including the face to face evaluation, reviewing of medical history and prior medial records, reviewing of diagnostic studies, ordering additional tests, documentation, patient education and coordination of care.     EMR Dragon/Transciption Disclaimer: Some of this note may be an electronic transcription/translation of spoken language to printed text.  The electronic translation of spoken language may permit erroneous, or at times, nonsensical words or phrases to be inadvertently transcribed. Although I have reviewed the note for such errors, some may still exist.         This document has been electronically signed by Delia DAVIS on July 27, 2023 13:21 CDT

## (undated) DEVICE — Device: Brand: DISPOSABLE ELECTROSURGICAL SNARE

## (undated) DEVICE — SOL IRR H2O BTL 1000ML STRL

## (undated) DEVICE — BITEBLOCK ENDO W/STRAP 60F A/ LF DISP

## (undated) DEVICE — TRAP SXN POLYP QUICKCATCH LF

## (undated) DEVICE — GLV SURG SENSICARE MICRO PF LF 7.5 STRL

## (undated) DEVICE — Device

## (undated) DEVICE — SINGLE-USE BIOPSY FORCEPS: Brand: RADIAL JAW 4

## (undated) DEVICE — KT INF MICROSMOOTH PARTS ULTR 0.9MM